# Patient Record
Sex: FEMALE | Race: WHITE | NOT HISPANIC OR LATINO | Employment: UNEMPLOYED | URBAN - METROPOLITAN AREA
[De-identification: names, ages, dates, MRNs, and addresses within clinical notes are randomized per-mention and may not be internally consistent; named-entity substitution may affect disease eponyms.]

---

## 2022-12-20 ENCOUNTER — OFFICE VISIT (OUTPATIENT)
Dept: FAMILY MEDICINE | Facility: CLINIC | Age: 57
End: 2022-12-20
Payer: COMMERCIAL

## 2022-12-20 VITALS
DIASTOLIC BLOOD PRESSURE: 72 MMHG | SYSTOLIC BLOOD PRESSURE: 110 MMHG | HEIGHT: 62 IN | HEART RATE: 101 BPM | TEMPERATURE: 98 F | BODY MASS INDEX: 26.56 KG/M2 | OXYGEN SATURATION: 97 % | WEIGHT: 144.31 LBS

## 2022-12-20 DIAGNOSIS — Z13.29 THYROID DISORDER SCREENING: ICD-10-CM

## 2022-12-20 DIAGNOSIS — Z12.11 COLON CANCER SCREENING: Primary | ICD-10-CM

## 2022-12-20 DIAGNOSIS — F32.A DEPRESSION, UNSPECIFIED DEPRESSION TYPE: ICD-10-CM

## 2022-12-20 DIAGNOSIS — Z13.1 DIABETES MELLITUS SCREENING: ICD-10-CM

## 2022-12-20 PROCEDURE — 1159F MED LIST DOCD IN RCRD: CPT | Mod: CPTII,S$GLB,, | Performed by: FAMILY MEDICINE

## 2022-12-20 PROCEDURE — 3008F PR BODY MASS INDEX (BMI) DOCUMENTED: ICD-10-PCS | Mod: CPTII,S$GLB,, | Performed by: FAMILY MEDICINE

## 2022-12-20 PROCEDURE — 3008F BODY MASS INDEX DOCD: CPT | Mod: CPTII,S$GLB,, | Performed by: FAMILY MEDICINE

## 2022-12-20 PROCEDURE — 99203 OFFICE O/P NEW LOW 30 MIN: CPT | Mod: S$GLB,,, | Performed by: FAMILY MEDICINE

## 2022-12-20 PROCEDURE — 1159F PR MEDICATION LIST DOCUMENTED IN MEDICAL RECORD: ICD-10-PCS | Mod: CPTII,S$GLB,, | Performed by: FAMILY MEDICINE

## 2022-12-20 PROCEDURE — 3074F SYST BP LT 130 MM HG: CPT | Mod: CPTII,S$GLB,, | Performed by: FAMILY MEDICINE

## 2022-12-20 PROCEDURE — 99203 PR OFFICE/OUTPT VISIT, NEW, LEVL III, 30-44 MIN: ICD-10-PCS | Mod: S$GLB,,, | Performed by: FAMILY MEDICINE

## 2022-12-20 PROCEDURE — 3078F DIAST BP <80 MM HG: CPT | Mod: CPTII,S$GLB,, | Performed by: FAMILY MEDICINE

## 2022-12-20 PROCEDURE — 3078F PR MOST RECENT DIASTOLIC BLOOD PRESSURE < 80 MM HG: ICD-10-PCS | Mod: CPTII,S$GLB,, | Performed by: FAMILY MEDICINE

## 2022-12-20 PROCEDURE — 3074F PR MOST RECENT SYSTOLIC BLOOD PRESSURE < 130 MM HG: ICD-10-PCS | Mod: CPTII,S$GLB,, | Performed by: FAMILY MEDICINE

## 2022-12-20 RX ORDER — ESCITALOPRAM OXALATE 20 MG/1
20 TABLET ORAL NIGHTLY
COMMUNITY
Start: 2022-12-15 | End: 2024-02-22 | Stop reason: SDUPTHER

## 2022-12-20 RX ORDER — BREXPIPRAZOLE 0.5 MG/1
0.5 TABLET ORAL DAILY
Qty: 30 TABLET | Refills: 0 | Status: SHIPPED | OUTPATIENT
Start: 2022-12-20 | End: 2023-01-30 | Stop reason: SDUPTHER

## 2022-12-20 RX ORDER — DIAZEPAM 5 MG/1
5 TABLET ORAL 3 TIMES DAILY
COMMUNITY
Start: 2022-12-15 | End: 2023-06-26 | Stop reason: SDUPTHER
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
## 2022-12-22 DIAGNOSIS — Z12.31 ENCOUNTER FOR SCREENING MAMMOGRAM FOR MALIGNANT NEOPLASM OF BREAST: Primary | ICD-10-CM

## 2023-01-09 ENCOUNTER — HOSPITAL ENCOUNTER (OUTPATIENT)
Dept: RADIOLOGY | Facility: HOSPITAL | Age: 58
Discharge: HOME | End: 2023-01-09
Attending: SPECIALIST
Payer: COMMERCIAL

## 2023-01-09 DIAGNOSIS — Z12.31 ENCOUNTER FOR SCREENING MAMMOGRAM FOR MALIGNANT NEOPLASM OF BREAST: ICD-10-CM

## 2023-01-09 PROCEDURE — 77067 SCR MAMMO BI INCL CAD: CPT | Mod: TC,PO

## 2023-01-24 LAB
ALBUMIN SERPL-MCNC: 4.1 G/DL (ref 3.6–5.1)
ALBUMIN/GLOB SERPL: 1.6 (CALC) (ref 1–2.5)
ALP SERPL-CCNC: 86 U/L (ref 37–153)
ALT SERPL-CCNC: 60 U/L (ref 6–29)
AST SERPL-CCNC: 35 U/L (ref 10–35)
BILIRUB SERPL-MCNC: 0.5 MG/DL (ref 0.2–1.2)
BUN SERPL-MCNC: 23 MG/DL (ref 7–25)
BUN/CREAT SERPL: ABNORMAL (CALC) (ref 6–22)
CALCIUM SERPL-MCNC: 9.4 MG/DL (ref 8.6–10.4)
CHLORIDE SERPL-SCNC: 105 MMOL/L (ref 98–110)
CO2 SERPL-SCNC: 30 MMOL/L (ref 20–32)
CREAT SERPL-MCNC: 0.72 MG/DL (ref 0.5–1.03)
EGFR: 96 ML/MIN/1.73M2
GLOBULIN SER CALC-MCNC: 2.6 G/DL (CALC) (ref 1.9–3.7)
GLUCOSE SERPL-MCNC: 83 MG/DL (ref 65–99)
POTASSIUM SERPL-SCNC: 4.5 MMOL/L (ref 3.5–5.3)
PROT SERPL-MCNC: 6.7 G/DL (ref 6.1–8.1)
SODIUM SERPL-SCNC: 140 MMOL/L (ref 135–146)
TSH SERPL-ACNC: 3.29 MIU/L (ref 0.4–4.5)

## 2023-01-30 ENCOUNTER — OFFICE VISIT (OUTPATIENT)
Dept: FAMILY MEDICINE | Facility: CLINIC | Age: 58
End: 2023-01-30
Payer: COMMERCIAL

## 2023-01-30 VITALS
HEART RATE: 86 BPM | SYSTOLIC BLOOD PRESSURE: 117 MMHG | DIASTOLIC BLOOD PRESSURE: 77 MMHG | BODY MASS INDEX: 28.31 KG/M2 | OXYGEN SATURATION: 97 % | HEIGHT: 62 IN | WEIGHT: 153.81 LBS

## 2023-01-30 DIAGNOSIS — Z13.220 ENCOUNTER FOR LIPID SCREENING FOR CARDIOVASCULAR DISEASE: ICD-10-CM

## 2023-01-30 DIAGNOSIS — F32.A DEPRESSION, UNSPECIFIED DEPRESSION TYPE: ICD-10-CM

## 2023-01-30 DIAGNOSIS — Z13.6 ENCOUNTER FOR LIPID SCREENING FOR CARDIOVASCULAR DISEASE: ICD-10-CM

## 2023-01-30 DIAGNOSIS — Z11.4 ENCOUNTER FOR SCREENING FOR HUMAN IMMUNODEFICIENCY VIRUS (HIV): Primary | ICD-10-CM

## 2023-01-30 DIAGNOSIS — Z12.11 COLON CANCER SCREENING: ICD-10-CM

## 2023-01-30 DIAGNOSIS — Z11.59 ENCOUNTER FOR HEPATITIS C SCREENING TEST FOR LOW RISK PATIENT: ICD-10-CM

## 2023-01-30 DIAGNOSIS — R74.01 TRANSAMINITIS: ICD-10-CM

## 2023-01-30 PROCEDURE — 3008F PR BODY MASS INDEX (BMI) DOCUMENTED: ICD-10-PCS | Mod: CPTII,S$GLB,, | Performed by: FAMILY MEDICINE

## 2023-01-30 PROCEDURE — 1159F MED LIST DOCD IN RCRD: CPT | Mod: CPTII,S$GLB,, | Performed by: FAMILY MEDICINE

## 2023-01-30 PROCEDURE — 3078F DIAST BP <80 MM HG: CPT | Mod: CPTII,S$GLB,, | Performed by: FAMILY MEDICINE

## 2023-01-30 PROCEDURE — 1159F PR MEDICATION LIST DOCUMENTED IN MEDICAL RECORD: ICD-10-PCS | Mod: CPTII,S$GLB,, | Performed by: FAMILY MEDICINE

## 2023-01-30 PROCEDURE — 3078F PR MOST RECENT DIASTOLIC BLOOD PRESSURE < 80 MM HG: ICD-10-PCS | Mod: CPTII,S$GLB,, | Performed by: FAMILY MEDICINE

## 2023-01-30 PROCEDURE — 3074F PR MOST RECENT SYSTOLIC BLOOD PRESSURE < 130 MM HG: ICD-10-PCS | Mod: CPTII,S$GLB,, | Performed by: FAMILY MEDICINE

## 2023-01-30 PROCEDURE — 3008F BODY MASS INDEX DOCD: CPT | Mod: CPTII,S$GLB,, | Performed by: FAMILY MEDICINE

## 2023-01-30 PROCEDURE — 99213 PR OFFICE/OUTPT VISIT, EST, LEVL III, 20-29 MIN: ICD-10-PCS | Mod: S$GLB,,, | Performed by: FAMILY MEDICINE

## 2023-01-30 PROCEDURE — 99213 OFFICE O/P EST LOW 20 MIN: CPT | Mod: S$GLB,,, | Performed by: FAMILY MEDICINE

## 2023-01-30 PROCEDURE — 3074F SYST BP LT 130 MM HG: CPT | Mod: CPTII,S$GLB,, | Performed by: FAMILY MEDICINE

## 2023-01-30 RX ORDER — BREXPIPRAZOLE 0.5 MG/1
0.5 TABLET ORAL DAILY
Qty: 90 TABLET | Refills: 1 | Status: SHIPPED | OUTPATIENT
Start: 2023-01-30 | End: 2023-06-26 | Stop reason: SDUPTHER

## 2023-01-30 NOTE — PROGRESS NOTES
SUBJECTIVE:    Patient ID: Tammy Yao is a 57 y.o. female.    Chief Complaint: Depression  56 yo female who was seen last month for depression was on Lexapro and diazepam in the last month was started on Rexulti 0.5 mg, patient who is accompanied by her daughter today states that her depression has much improved.    Patient recently had basic labs completed that showed an elevated ALT we have no previous labs to evaluate if this is an increase.  Her only medications currently are Lexapro diazepam and Rexulti will need to get follow-up labs.           Significant Past medical history   Depression: Lexapro 20 mg, Rexulti 0.5 mg  Insomnia: Diazepam 5 mg     Specialists  Gyn : Dr JERRY Paredes     Smoke:None  ETOH: None  Exercise: None     TSH 0.40 - 4.50 mIU/L 3.29      Glucose 65 - 99 mg/dL 83    Comment:          AST 10 - 35 U/L 35    ALT 6 - 29 U/L 60 High         HPI     Depression  Visit Type: follow-up  Patient presents with the following symptoms: anhedonia, depressed mood and excessive worry.  Patient is not experiencing: insomnia, memory impairment, nausea, nervousness/anxiety, palpitations, panic, psychomotor agitation, psychomotor retardation, restlessness, shortness of breath, suicidal ideas, suicidal planning, thoughts of death, weight gain and weight loss.   Severity: moderate   Sleep quality: fair  Compliance with medications:  %       History reviewed. No pertinent past medical history.  Social History     Socioeconomic History    Marital status:    Tobacco Use    Smoking status: Never    Smokeless tobacco: Never   Substance and Sexual Activity    Alcohol use: Yes     Comment: Occ    Drug use: Never    Sexual activity: Yes     Partners: Male     History reviewed. No pertinent surgical history.  Family History   Problem Relation Age of Onset    Diabetes Mother     Dementia Mother     Cancer Father     Diabetes Maternal Grandmother     Depression Maternal Grandmother      Current  "Outpatient Medications   Medication Sig Dispense Refill    brexpiprazole (REXULTI) 0.5 mg Tab Take 0.5 mg by mouth once daily. 90 tablet 1    diazePAM (VALIUM) 5 MG tablet Take 5 mg by mouth 3 (three) times daily.      EScitalopram oxalate (LEXAPRO) 20 MG tablet Take 20 mg by mouth every evening.       No current facility-administered medications for this visit.     Review of patient's allergies indicates:  No Known Allergies    Review of Systems   Constitutional:  Positive for activity change. Negative for diaphoresis, fatigue and unexpected weight change.   HENT:  Negative for congestion, hearing loss, rhinorrhea, sinus pressure, sinus pain and trouble swallowing.    Eyes:  Negative for discharge and visual disturbance.   Respiratory:  Negative for cough, chest tightness, shortness of breath and wheezing.    Cardiovascular:  Negative for chest pain and palpitations.   Gastrointestinal:  Negative for abdominal distention, abdominal pain, anal bleeding, blood in stool, constipation, diarrhea and vomiting.   Endocrine: Negative for polydipsia and polyuria.   Genitourinary:  Negative for difficulty urinating, dysuria, hematuria and menstrual problem.   Musculoskeletal:  Negative for arthralgias, joint swelling and neck pain.   Neurological:  Negative for weakness and headaches.   Psychiatric/Behavioral:  Negative for confusion and dysphoric mood.         Blood pressure 117/77, pulse 86, height 5' 2" (1.575 m), weight 69.8 kg (153 lb 12.8 oz), SpO2 97 %. Body mass index is 28.13 kg/m².   Objective:      Physical Exam  Vitals reviewed.   Constitutional:       General: She is not in acute distress.     Appearance: Normal appearance. She is not ill-appearing or toxic-appearing.   HENT:      Head: Normocephalic and atraumatic.      Right Ear: Tympanic membrane normal.      Left Ear: Tympanic membrane normal.      Nose: Nose normal. No congestion or rhinorrhea.   Cardiovascular:      Rate and Rhythm: Normal rate and regular " rhythm.      Pulses: Normal pulses.      Heart sounds: Normal heart sounds. No murmur heard.  Pulmonary:      Effort: Pulmonary effort is normal. No respiratory distress.      Breath sounds: Normal breath sounds. No wheezing.   Skin:     General: Skin is warm and dry.      Capillary Refill: Capillary refill takes less than 2 seconds.   Neurological:      Mental Status: She is alert and oriented to person, place, and time.           Assessment:       1. Encounter for screening for human immunodeficiency virus (HIV)    2. Colon cancer screening    3. Encounter for hepatitis C screening test for low risk patient    4. Transaminitis    5. Encounter for lipid screening for cardiovascular disease    6. Depression, unspecified depression type         Plan:           Encounter for screening for human immunodeficiency virus (HIV)  -     HIV 1/2 Ag/Ab (4th Gen); Future; Expected date: 01/30/2023    Colon cancer screening  -     Ambulatory referral/consult to Gastroenterology; Future; Expected date: 02/05/2023    Encounter for hepatitis C screening test for low risk patient  -     Hepatitis C Antibody; Future; Expected date: 01/30/2023    Transaminitis  -     Comprehensive Metabolic Panel; Future; Expected date: 01/30/2023    Encounter for lipid screening for cardiovascular disease  -     Lipid Panel; Future; Expected date: 01/30/2023    Depression, unspecified depression type  -     brexpiprazole (REXULTI) 0.5 mg Tab; Take 0.5 mg by mouth once daily.  Dispense: 90 tablet; Refill: 1

## 2023-02-02 ENCOUNTER — HOSPITAL ENCOUNTER (OUTPATIENT)
Dept: RADIOLOGY | Facility: HOSPITAL | Age: 58
Discharge: HOME | End: 2023-02-02
Attending: SPECIALIST
Payer: COMMERCIAL

## 2023-02-02 DIAGNOSIS — R92.2 INCONCLUSIVE MAMMOGRAM: ICD-10-CM

## 2023-02-02 PROCEDURE — 77066 DX MAMMO INCL CAD BI: CPT | Mod: TC,PO

## 2023-04-26 ENCOUNTER — PATIENT MESSAGE (OUTPATIENT)
Dept: FAMILY MEDICINE | Facility: CLINIC | Age: 58
End: 2023-04-26

## 2023-04-28 ENCOUNTER — LAB VISIT (OUTPATIENT)
Dept: LAB | Facility: HOSPITAL | Age: 58
End: 2023-04-28
Attending: FAMILY MEDICINE
Payer: COMMERCIAL

## 2023-04-28 DIAGNOSIS — Z13.220 ENCOUNTER FOR LIPID SCREENING FOR CARDIOVASCULAR DISEASE: ICD-10-CM

## 2023-04-28 DIAGNOSIS — Z11.59 ENCOUNTER FOR HEPATITIS C SCREENING TEST FOR LOW RISK PATIENT: ICD-10-CM

## 2023-04-28 DIAGNOSIS — R74.01 TRANSAMINITIS: ICD-10-CM

## 2023-04-28 DIAGNOSIS — Z11.4 ENCOUNTER FOR SCREENING FOR HUMAN IMMUNODEFICIENCY VIRUS (HIV): ICD-10-CM

## 2023-04-28 DIAGNOSIS — Z13.6 ENCOUNTER FOR LIPID SCREENING FOR CARDIOVASCULAR DISEASE: ICD-10-CM

## 2023-04-28 LAB
ALBUMIN SERPL BCP-MCNC: 3.9 G/DL (ref 3.5–5.2)
ALP SERPL-CCNC: 75 U/L (ref 55–135)
ALT SERPL W/O P-5'-P-CCNC: 39 U/L (ref 10–44)
ANION GAP SERPL CALC-SCNC: 6 MMOL/L (ref 8–16)
AST SERPL-CCNC: 30 U/L (ref 10–40)
BILIRUB SERPL-MCNC: 0.6 MG/DL (ref 0.1–1)
BUN SERPL-MCNC: 22 MG/DL (ref 6–20)
CALCIUM SERPL-MCNC: 9.4 MG/DL (ref 8.7–10.5)
CHLORIDE SERPL-SCNC: 106 MMOL/L (ref 95–110)
CHOLEST SERPL-MCNC: 293 MG/DL (ref 120–199)
CHOLEST/HDLC SERPL: 3.2 {RATIO} (ref 2–5)
CO2 SERPL-SCNC: 27 MMOL/L (ref 23–29)
CREAT SERPL-MCNC: 0.7 MG/DL (ref 0.5–1.4)
EST. GFR  (NO RACE VARIABLE): >60 ML/MIN/1.73 M^2
GLUCOSE SERPL-MCNC: 85 MG/DL (ref 70–110)
HDLC SERPL-MCNC: 91 MG/DL (ref 40–75)
HDLC SERPL: 31.1 % (ref 20–50)
LDLC SERPL CALC-MCNC: 189 MG/DL (ref 63–159)
NONHDLC SERPL-MCNC: 202 MG/DL
POTASSIUM SERPL-SCNC: 3.8 MMOL/L (ref 3.5–5.1)
PROT SERPL-MCNC: 7.4 G/DL (ref 6–8.4)
SODIUM SERPL-SCNC: 139 MMOL/L (ref 136–145)
TRIGL SERPL-MCNC: 65 MG/DL (ref 30–150)

## 2023-04-28 PROCEDURE — 36415 COLL VENOUS BLD VENIPUNCTURE: CPT | Performed by: FAMILY MEDICINE

## 2023-04-28 PROCEDURE — 80061 LIPID PANEL: CPT | Performed by: FAMILY MEDICINE

## 2023-04-28 PROCEDURE — 86803 HEPATITIS C AB TEST: CPT | Performed by: FAMILY MEDICINE

## 2023-04-28 PROCEDURE — 80053 COMPREHEN METABOLIC PANEL: CPT | Performed by: FAMILY MEDICINE

## 2023-04-28 PROCEDURE — 87389 HIV-1 AG W/HIV-1&-2 AB AG IA: CPT | Performed by: FAMILY MEDICINE

## 2023-04-29 LAB
HCV AB S/CO SERPL IA: NON REACTIVE
HIV 1+2 AB+HIV1 P24 AG SERPL QL IA: NON REACTIVE

## 2023-05-01 ENCOUNTER — OFFICE VISIT (OUTPATIENT)
Dept: FAMILY MEDICINE | Facility: CLINIC | Age: 58
End: 2023-05-01
Payer: COMMERCIAL

## 2023-05-01 VITALS
WEIGHT: 173.63 LBS | OXYGEN SATURATION: 97 % | HEIGHT: 62 IN | SYSTOLIC BLOOD PRESSURE: 103 MMHG | BODY MASS INDEX: 31.95 KG/M2 | DIASTOLIC BLOOD PRESSURE: 67 MMHG | HEART RATE: 84 BPM

## 2023-05-01 DIAGNOSIS — R74.01 TRANSAMINITIS: ICD-10-CM

## 2023-05-01 DIAGNOSIS — F32.A DEPRESSION, UNSPECIFIED DEPRESSION TYPE: Primary | ICD-10-CM

## 2023-05-01 PROCEDURE — 3008F PR BODY MASS INDEX (BMI) DOCUMENTED: ICD-10-PCS | Mod: CPTII,S$GLB,, | Performed by: FAMILY MEDICINE

## 2023-05-01 PROCEDURE — 99213 OFFICE O/P EST LOW 20 MIN: CPT | Mod: S$GLB,,, | Performed by: FAMILY MEDICINE

## 2023-05-01 PROCEDURE — 3078F PR MOST RECENT DIASTOLIC BLOOD PRESSURE < 80 MM HG: ICD-10-PCS | Mod: CPTII,S$GLB,, | Performed by: FAMILY MEDICINE

## 2023-05-01 PROCEDURE — 1159F MED LIST DOCD IN RCRD: CPT | Mod: CPTII,S$GLB,, | Performed by: FAMILY MEDICINE

## 2023-05-01 PROCEDURE — 3074F PR MOST RECENT SYSTOLIC BLOOD PRESSURE < 130 MM HG: ICD-10-PCS | Mod: CPTII,S$GLB,, | Performed by: FAMILY MEDICINE

## 2023-05-01 PROCEDURE — 1159F PR MEDICATION LIST DOCUMENTED IN MEDICAL RECORD: ICD-10-PCS | Mod: CPTII,S$GLB,, | Performed by: FAMILY MEDICINE

## 2023-05-01 PROCEDURE — 3074F SYST BP LT 130 MM HG: CPT | Mod: CPTII,S$GLB,, | Performed by: FAMILY MEDICINE

## 2023-05-01 PROCEDURE — 3078F DIAST BP <80 MM HG: CPT | Mod: CPTII,S$GLB,, | Performed by: FAMILY MEDICINE

## 2023-05-01 PROCEDURE — 3008F BODY MASS INDEX DOCD: CPT | Mod: CPTII,S$GLB,, | Performed by: FAMILY MEDICINE

## 2023-05-01 PROCEDURE — 99213 PR OFFICE/OUTPT VISIT, EST, LEVL III, 20-29 MIN: ICD-10-PCS | Mod: S$GLB,,, | Performed by: FAMILY MEDICINE

## 2023-05-01 NOTE — PROGRESS NOTES
SUBJECTIVE:    Patient ID: Tammy Yao is a 58 y.o. female.    Chief Complaint: Lab Review  59 yo female who was started on lexapro and rexulti for resistant depression, pt is doing well with her depression, at last visit pt had elevated LFT, we repeated and her enzymes have returned to normal.       Patient had basic labs completed after her initial visit and she had  elevated ALT , her repeat labs have normalized.      AST 10 - 40 U/L 30  35 R    ALT 10 - 44 U/L 39  60 High  R         Significant Past medical history   Depression: Lexapro 20 mg, Rexulti 0.5 mg  Insomnia: Diazepam 5 mg     Specialists  Gyn : Dr JERRY Paredes  GI: Dr Heredia     Smoke:None  ETOH: None  Exercise: None    Lipid  Chol: 293  Tri  HDL: 91  LDL: 189      HPI      History reviewed. No pertinent past medical history.  Social History     Socioeconomic History    Marital status:    Tobacco Use    Smoking status: Never    Smokeless tobacco: Never   Substance and Sexual Activity    Alcohol use: Yes     Comment: Occ    Drug use: Never    Sexual activity: Yes     Partners: Male     History reviewed. No pertinent surgical history.  Family History   Problem Relation Age of Onset    Diabetes Mother     Dementia Mother     Cancer Father     Diabetes Maternal Grandmother     Depression Maternal Grandmother      Current Outpatient Medications   Medication Sig Dispense Refill    brexpiprazole (REXULTI) 0.5 mg Tab Take 0.5 mg by mouth once daily. 90 tablet 1    diazePAM (VALIUM) 5 MG tablet Take 5 mg by mouth 3 (three) times daily.      EScitalopram oxalate (LEXAPRO) 20 MG tablet Take 20 mg by mouth every evening.       No current facility-administered medications for this visit.     Review of patient's allergies indicates:  No Known Allergies    Review of Systems   Constitutional:  Positive for activity change and unexpected weight change (pt has gained weight since starting on the rexaulti.). Negative for diaphoresis and fatigue.   HENT:   "Negative for congestion, hearing loss, rhinorrhea and trouble swallowing.    Eyes:  Negative for discharge and visual disturbance.   Respiratory:  Negative for cough, chest tightness, shortness of breath and wheezing.    Cardiovascular:  Negative for chest pain and palpitations.   Gastrointestinal:  Negative for blood in stool, constipation, diarrhea and vomiting.   Endocrine: Negative for polydipsia and polyuria.   Genitourinary:  Negative for difficulty urinating, dysuria, hematuria and menstrual problem.   Musculoskeletal:  Negative for arthralgias, joint swelling and neck pain.   Neurological:  Negative for weakness and headaches.   Psychiatric/Behavioral:  Negative for confusion and dysphoric mood.         Blood pressure 103/67, pulse 84, height 5' 2" (1.575 m), weight 78.7 kg (173 lb 9.6 oz), SpO2 97 %. Body mass index is 31.75 kg/m².   Objective:      Physical Exam  Vitals reviewed.   Constitutional:       General: She is not in acute distress.     Appearance: Normal appearance. She is not ill-appearing or toxic-appearing.   HENT:      Head: Normocephalic and atraumatic.      Right Ear: Tympanic membrane normal.      Left Ear: Tympanic membrane normal.      Nose: Nose normal. No congestion or rhinorrhea.   Cardiovascular:      Rate and Rhythm: Normal rate and regular rhythm.      Pulses: Normal pulses.      Heart sounds: Normal heart sounds. No murmur heard.  Pulmonary:      Effort: Pulmonary effort is normal. No respiratory distress.      Breath sounds: Normal breath sounds. No wheezing.   Skin:     General: Skin is warm and dry.      Capillary Refill: Capillary refill takes less than 2 seconds.   Neurological:      Mental Status: She is alert and oriented to person, place, and time.           Assessment:       1. Depression, unspecified depression type    2. Transaminitis         Plan:           Depression, unspecified depression type  Controlled will continue on current dose of " medications  Transaminitis  Elevated ALT is resolved will not continue with further workup

## 2023-06-05 LAB — CRC RECOMMENDATION EXT: NORMAL

## 2023-06-06 ENCOUNTER — PATIENT OUTREACH (OUTPATIENT)
Dept: ADMINISTRATIVE | Facility: HOSPITAL | Age: 58
End: 2023-06-06

## 2023-06-06 NOTE — PROGRESS NOTES
Population Health Chart Review & Patient Outreach Details:     Reason for Outreach Encounter:     [x]  Non-Compliant Report   []  Payor Report ([employer], PHN, BCBS, MSSP, MCIP, UHC, etc.)   []  Pre-Visit Chart Review     Updates Requested / Reviewed:     []  Care Everywhere    []     []  External Sources ([facility], [facility], DIS, etc.)   [x]  Care Team Updated    Patient Outreach Method:    []  Telephone Outreach Completed   [] Successful   [] Left Voicemail   [] Unable to Contact (wrong number, no voicemail)  []  [employer] Portal Outreach Sent  []  Letter Outreach Mailed  []  Fax Sent for External Records  [x]  External Records Upload    Health Maintenance Topics Addressed and Outreach Outcomes / Actions Taken:        []      Breast Cancer Screening []  Mammo Scheduled      []  External Records Requested     []  Added Reminder to Complete to Upcoming Primary Care Appt Notes     []  Patient Declined     []  Patient Will Call Back to Schedule     []  Patient Will Schedule with External Provider / Order Routed if Applicable             []       Cervical Cancer Screening []  Pap Scheduled      []  External Records Requested     []  Added Reminder to Complete to Upcoming Primary Care Appt Notes     []  Patient Declined     []  Patient Will Call Back to Schedule     []  Patient Will Schedule with External Provider               [x]          Colorectal Cancer Screening []  Colonoscopy Case Request or Referral Placed     []  External Records Requested     []  Added Reminder to Complete to Upcoming Primary Care Appt Notes     []  Patient Declined     []  Patient Will Call Back to Schedule     []  Patient Will Schedule with External Provider     []  Fit Kit Mailed (add the SmartPhrase under additional notes)     []  Reminded Patient to Complete Home Test             []      Diabetic Eye Exam []  Eye Camera Scheduled or Optometry Referral Placed     []  External Records Requested     []  Added Reminder to Complete to  Upcoming Primary Care Appt Notes     []  Patient Declined     []  Patient Will Call Back to Schedule     []  Patient Will Schedule with External Provider             []      Blood Pressure Control []  Primary Care Follow Up Visit Scheduled     []  Remote Blood Pressure Reading Captured     []  Added Reminder to Complete to Upcoming Primary Care Appt Notes     []  Patient Declined     []  Patient Will Call Back / Patient Will Send Portal Message with Reading     []  Patient Will Call Back to Schedule Provider Visit             []       HbA1c & Other Labs []  Lab Appt Scheduled for Due Labs     []  Primary Care Follow Up Visit Scheduled      []  Reminded Patient to Complete Home Test     []  Added Reminder to Complete to Upcoming Primary Care Appt Notes     []  Patient Declined     []  Patient Will Call Back to Schedule     []  Patient Will Schedule with External Provider / Order Routed if Applicable           []    Schedule Primary Care Appt []  Primary Care Appt Scheduled     []  Patient Declined     []  Patient Will Call Back to Schedule     []  Pt Established with External Provider & Updated Care Team             []      Medication Adherence []  Primary Care Appointment Scheduled     []  Added Reminder to Upcoming Primary Care Appt Notes     []  Patient Reminded to  Prescription     []  Patient Declined, Provider Notified if Needed     []  Sent Provider Message to Review and/or Add Exclusion to Problem List             []      Osteoporosis Screening []  DXA Appointment Scheduled     []  External Records Requested     []  Added Reminder to Complete to Upcoming Primary Care Appt Notes     []  Patient Declined     []  Patient Will Call Back to Schedule     []  Patient Will Schedule with External Provider / Order Routed if Applicable     Additional Care Coordinator Notes:    External record ( Colonoscopy Report 6-5-23 ) hyperlinked in Health Maintenance.  Immunizations reviewed.     Further Action Needed If  Patient Returns Outreach:

## 2023-06-26 DIAGNOSIS — F32.A DEPRESSION, UNSPECIFIED DEPRESSION TYPE: ICD-10-CM

## 2023-06-26 RX ORDER — DIAZEPAM 5 MG/1
5 TABLET ORAL DAILY PRN
Qty: 30 TABLET | Refills: 0 | Status: SHIPPED | OUTPATIENT
Start: 2023-06-26

## 2023-06-26 RX ORDER — BREXPIPRAZOLE 0.5 MG/1
0.5 TABLET ORAL DAILY
Qty: 90 TABLET | Refills: 1 | Status: SHIPPED | OUTPATIENT
Start: 2023-06-26 | End: 2024-01-23

## 2024-01-23 DIAGNOSIS — F32.A DEPRESSION, UNSPECIFIED DEPRESSION TYPE: ICD-10-CM

## 2024-01-23 RX ORDER — BREXPIPRAZOLE 0.5 MG/1
1 TABLET ORAL
Qty: 90 TABLET | Refills: 0 | Status: SHIPPED | OUTPATIENT
Start: 2024-01-23 | End: 2024-04-15

## 2024-02-22 DIAGNOSIS — F32.A DEPRESSION, UNSPECIFIED DEPRESSION TYPE: Primary | ICD-10-CM

## 2024-02-23 RX ORDER — ESCITALOPRAM OXALATE 20 MG/1
20 TABLET ORAL NIGHTLY
Qty: 90 TABLET | Refills: 1 | Status: SHIPPED | OUTPATIENT
Start: 2024-02-23

## 2024-04-01 ENCOUNTER — OFFICE VISIT (OUTPATIENT)
Dept: FAMILY MEDICINE | Facility: CLINIC | Age: 59
End: 2024-04-01
Payer: COMMERCIAL

## 2024-04-01 VITALS
WEIGHT: 204 LBS | HEIGHT: 62 IN | DIASTOLIC BLOOD PRESSURE: 86 MMHG | HEART RATE: 68 BPM | SYSTOLIC BLOOD PRESSURE: 132 MMHG | OXYGEN SATURATION: 98 % | BODY MASS INDEX: 37.54 KG/M2

## 2024-04-01 DIAGNOSIS — M54.31 RIGHT SIDED SCIATICA: ICD-10-CM

## 2024-04-01 DIAGNOSIS — R22.41 LOCALIZED SWELLING OF RIGHT LOWER EXTREMITY: Primary | ICD-10-CM

## 2024-04-01 PROCEDURE — 3008F BODY MASS INDEX DOCD: CPT | Mod: CPTII,S$GLB,, | Performed by: NURSE PRACTITIONER

## 2024-04-01 PROCEDURE — 1160F RVW MEDS BY RX/DR IN RCRD: CPT | Mod: CPTII,S$GLB,, | Performed by: NURSE PRACTITIONER

## 2024-04-01 PROCEDURE — 1159F MED LIST DOCD IN RCRD: CPT | Mod: CPTII,S$GLB,, | Performed by: NURSE PRACTITIONER

## 2024-04-01 PROCEDURE — 99214 OFFICE O/P EST MOD 30 MIN: CPT | Mod: S$GLB,,, | Performed by: NURSE PRACTITIONER

## 2024-04-01 PROCEDURE — 3075F SYST BP GE 130 - 139MM HG: CPT | Mod: CPTII,S$GLB,, | Performed by: NURSE PRACTITIONER

## 2024-04-01 PROCEDURE — 3079F DIAST BP 80-89 MM HG: CPT | Mod: CPTII,S$GLB,, | Performed by: NURSE PRACTITIONER

## 2024-04-01 PROCEDURE — 99999 PR PBB SHADOW E&M-EST. PATIENT-LVL IV: CPT | Mod: PBBFAC,,, | Performed by: NURSE PRACTITIONER

## 2024-04-01 RX ORDER — METHYLPREDNISOLONE 4 MG/1
TABLET ORAL
Qty: 21 EACH | Refills: 0 | Status: SHIPPED | OUTPATIENT
Start: 2024-04-01 | End: 2024-04-22

## 2024-04-01 RX ORDER — NAPROXEN 500 MG/1
500 TABLET ORAL 2 TIMES DAILY
Qty: 28 TABLET | Refills: 0 | Status: SHIPPED | OUTPATIENT
Start: 2024-04-01 | End: 2024-05-13

## 2024-04-01 NOTE — PROGRESS NOTES
SUBJECTIVE:      Patient ID: Tammy Yao is a 59 y.o. female.    Chief Complaint: Back Pain and Leg Swelling    59-year-old female presents to the clinic with complaints of back pain and leg swelling. Symptom started 1 week ago. Back pain occurred from a twisting injury. Pain is to the right lower back radiating to right lower extremity. Right calf and lower leg swelling started yesterday, first notice after waking up. Treating symptoms with ice and ibuprofen. Denies clotting disorders, DVT/PE. Denies calf pain, numbness/tingling, bladder/bowel incontinence.         Family History   Problem Relation Age of Onset    Diabetes Mother     Dementia Mother     Cancer Father     Diabetes Maternal Grandmother     Depression Maternal Grandmother       Social History     Socioeconomic History    Marital status:    Tobacco Use    Smoking status: Never    Smokeless tobacco: Never   Substance and Sexual Activity    Alcohol use: Yes     Comment: Occ    Drug use: Never    Sexual activity: Yes     Partners: Male     Current Outpatient Medications   Medication Sig Dispense Refill    diazePAM (VALIUM) 5 MG tablet Take 1 tablet (5 mg total) by mouth daily as needed for Anxiety (panic attack). 30 tablet 0    EScitalopram oxalate (LEXAPRO) 20 MG tablet Take 1 tablet (20 mg total) by mouth every evening. 90 tablet 1    methylPREDNISolone (MEDROL DOSEPACK) 4 mg tablet use as directed 21 each 0    naproxen (NAPROSYN) 500 MG tablet Take 1 tablet (500 mg total) by mouth 2 (two) times daily. 28 tablet 0    REXULTI 0.5 mg Tab Take 1 tablet by mouth once daily 90 tablet 0     No current facility-administered medications for this visit.     Review of patient's allergies indicates:  No Known Allergies   Past Medical History:   Diagnosis Date    Personal history of colonic polyps 06/05/2023     History reviewed. No pertinent surgical history.    Review of Systems   Constitutional:  Negative for activity change, appetite change, chills,  "diaphoresis, fatigue, fever and unexpected weight change.   HENT:  Negative for congestion, ear pain, sinus pressure, sore throat, trouble swallowing and voice change.    Eyes:  Negative for pain, discharge and visual disturbance.   Respiratory:  Negative for cough, chest tightness, shortness of breath and wheezing.    Cardiovascular:  Positive for leg swelling (right). Negative for chest pain and palpitations.   Gastrointestinal:  Negative for abdominal pain, constipation, diarrhea, nausea and vomiting.   Genitourinary:  Negative for difficulty urinating, flank pain, frequency and urgency.   Musculoskeletal:  Positive for back pain. Negative for joint swelling and neck pain.   Skin:  Negative for color change and rash.   Neurological:  Negative for dizziness, seizures, syncope, weakness, numbness and headaches.   Hematological:  Negative for adenopathy.   Psychiatric/Behavioral:  Negative for dysphoric mood and sleep disturbance. The patient is not nervous/anxious.       OBJECTIVE:      Vitals:    04/01/24 1412 04/01/24 1441   BP: (!) 142/86 132/86   BP Location: Left arm    Patient Position: Sitting    BP Method: Medium (Manual)    Pulse: 68    SpO2: 98%    Weight: 92.5 kg (204 lb)    Height: 5' 2" (1.575 m)      Physical Exam  Vitals and nursing note reviewed.   Constitutional:       General: She is awake. She is not in acute distress.     Appearance: Normal appearance. She is not ill-appearing, toxic-appearing or diaphoretic.   HENT:      Head: Normocephalic and atraumatic.      Nose: Nose normal.   Eyes:      General: Lids are normal. Gaze aligned appropriately.      Conjunctiva/sclera: Conjunctivae normal.      Right eye: Right conjunctiva is not injected.      Left eye: Left conjunctiva is not injected.      Pupils: Pupils are equal, round, and reactive to light.   Cardiovascular:      Rate and Rhythm: Normal rate and regular rhythm.      Pulses: Normal pulses.           Dorsalis pedis pulses are 2+ on the " right side.        Posterior tibial pulses are 2+ on the right side.      Heart sounds: Normal heart sounds, S1 normal and S2 normal. No murmur heard.     No friction rub. No gallop.   Pulmonary:      Effort: Pulmonary effort is normal. No respiratory distress.      Breath sounds: Normal breath sounds. No stridor. No decreased breath sounds, wheezing, rhonchi or rales.   Chest:      Chest wall: No tenderness.   Musculoskeletal:      Cervical back: Neck supple.      Lumbar back: Tenderness present. Decreased range of motion. Positive right straight leg raise test.        Back:       Right lower leg: Swelling present. No edema.      Left lower leg: No edema.      Comments: There is no midline spinal tenderness or palpable deformity. Right calf and right ankle swelling. No erythema noted. Jonelle's sign negative.    Lymphadenopathy:      Cervical: No cervical adenopathy.   Skin:     General: Skin is warm and dry.      Capillary Refill: Capillary refill takes less than 2 seconds.      Findings: No erythema or rash.   Neurological:      Mental Status: She is alert and oriented to person, place, and time. Mental status is at baseline.   Psychiatric:         Attention and Perception: Attention normal.         Mood and Affect: Mood normal.         Speech: Speech normal.         Behavior: Behavior normal. Behavior is cooperative.         Thought Content: Thought content normal.         Judgment: Judgment normal.        Assessment:       1. Localized swelling of right lower extremity    2. Right sided sciatica        Plan:       Localized swelling of right lower extremity  Will check venous US to r/o DVT.   -     US Lower Extremity Veins Right; Future; Expected date: 04/01/2024    Right sided sciatica  Start medrol dose pack and Naproxen. Rest. Apply heat or ice. Avoid heavy lifting and twisting. Back exercises discussed and provided at discharge. Discussed PT if no improvement.   -     methylPREDNISolone (MEDROL DOSEPACK) 4 mg  tablet; use as directed  Dispense: 21 each; Refill: 0  -     naproxen (NAPROSYN) 500 MG tablet; Take 1 tablet (500 mg total) by mouth 2 (two) times daily.  Dispense: 28 tablet; Refill: 0    This note was created using [employer] voice recognition software that occasionally misinterprets phrases or words.     I spent a total of 25 minutes on the day of the visit.This includes face to face time and non-face to face time preparing to see the patient (eg, review of tests), obtaining and/or reviewing separately obtained history, documenting clinical information in the electronic or other health record, independently interpreting results and communicating results to the patient/family/caregiver, or care coordinator.    Follow up if symptoms worsen or fail to improve.          4/1/2024 SHARON Antony, FNP

## 2024-04-04 ENCOUNTER — HOSPITAL ENCOUNTER (OUTPATIENT)
Dept: RADIOLOGY | Facility: HOSPITAL | Age: 59
Discharge: HOME | End: 2024-04-04
Attending: NURSE PRACTITIONER
Payer: COMMERCIAL

## 2024-04-04 DIAGNOSIS — R22.41 LOCALIZED SWELLING OF RIGHT LOWER EXTREMITY: ICD-10-CM

## 2024-04-04 PROCEDURE — 93971 EXTREMITY STUDY: CPT | Mod: 26,RT,, | Performed by: RADIOLOGY

## 2024-04-04 PROCEDURE — 93971 EXTREMITY STUDY: CPT | Mod: TC,RT

## 2024-04-14 DIAGNOSIS — F32.A DEPRESSION, UNSPECIFIED DEPRESSION TYPE: ICD-10-CM

## 2024-04-15 RX ORDER — BREXPIPRAZOLE 0.5 MG/1
1 TABLET ORAL
Qty: 90 TABLET | Refills: 0 | Status: SHIPPED | OUTPATIENT
Start: 2024-04-15 | End: 2024-05-13 | Stop reason: ALTCHOICE

## 2024-04-15 NOTE — TELEPHONE ENCOUNTER
Last visit was 04/01/2024 with YOLANDA Montenegro. Last visit with you was 05/01/2023. Next appointment is 05/13/2024

## 2024-05-13 ENCOUNTER — OFFICE VISIT (OUTPATIENT)
Dept: FAMILY MEDICINE | Facility: CLINIC | Age: 59
End: 2024-05-13
Payer: COMMERCIAL

## 2024-05-13 ENCOUNTER — TELEPHONE (OUTPATIENT)
Dept: PSYCHIATRY | Facility: CLINIC | Age: 59
End: 2024-05-13
Payer: COMMERCIAL

## 2024-05-13 VITALS
WEIGHT: 197.88 LBS | SYSTOLIC BLOOD PRESSURE: 110 MMHG | DIASTOLIC BLOOD PRESSURE: 64 MMHG | HEIGHT: 62 IN | HEART RATE: 95 BPM | BODY MASS INDEX: 36.42 KG/M2 | OXYGEN SATURATION: 97 %

## 2024-05-13 DIAGNOSIS — L72.11 PILAR CYST: ICD-10-CM

## 2024-05-13 DIAGNOSIS — Z13.31 POSITIVE DEPRESSION SCREENING: ICD-10-CM

## 2024-05-13 DIAGNOSIS — F41.9 ANXIETY: Primary | ICD-10-CM

## 2024-05-13 DIAGNOSIS — F32.0 MAJOR DEPRESSIVE DISORDER, SINGLE EPISODE, MILD: ICD-10-CM

## 2024-05-13 DIAGNOSIS — F32.A DEPRESSION, UNSPECIFIED DEPRESSION TYPE: ICD-10-CM

## 2024-05-13 PROCEDURE — 1159F MED LIST DOCD IN RCRD: CPT | Mod: CPTII,S$GLB,, | Performed by: FAMILY MEDICINE

## 2024-05-13 PROCEDURE — 3008F BODY MASS INDEX DOCD: CPT | Mod: CPTII,S$GLB,, | Performed by: FAMILY MEDICINE

## 2024-05-13 PROCEDURE — 3078F DIAST BP <80 MM HG: CPT | Mod: CPTII,S$GLB,, | Performed by: FAMILY MEDICINE

## 2024-05-13 PROCEDURE — 3074F SYST BP LT 130 MM HG: CPT | Mod: CPTII,S$GLB,, | Performed by: FAMILY MEDICINE

## 2024-05-13 PROCEDURE — 99999 PR PBB SHADOW E&M-EST. PATIENT-LVL IV: CPT | Mod: PBBFAC,,, | Performed by: FAMILY MEDICINE

## 2024-05-13 PROCEDURE — 99214 OFFICE O/P EST MOD 30 MIN: CPT | Mod: S$GLB,,, | Performed by: FAMILY MEDICINE

## 2024-05-13 RX ORDER — BREXPIPRAZOLE 1 MG/1
1 TABLET ORAL DAILY
Qty: 90 TABLET | Refills: 1 | Status: SHIPPED | OUTPATIENT
Start: 2024-05-13

## 2024-05-13 RX ORDER — DIAZEPAM 2 MG/1
2 TABLET ORAL EVERY 8 HOURS PRN
Qty: 20 TABLET | Refills: 0 | Status: SHIPPED | OUTPATIENT
Start: 2024-05-13 | End: 2024-06-17

## 2024-05-13 NOTE — TELEPHONE ENCOUNTER
Spoke to patient regarding the below message. Advised of the wait list. Patient will speak with daughter and call our office back to let us know if she needs to be placed on the wait list.

## 2024-05-13 NOTE — PROGRESS NOTES
SUBJECTIVE:    Patient ID: Tammy Yao is a 59 y.o. female.    Chief Complaint: Anxiety and Depression  59 yo female who was supposed to be here today for annual exam, but they have a complaint of increasing depression and anxiety. Several years ago when she first established she was started on Lexapro and Rexulti, her medications have remained stable. Her daughter accompanies her today stating that she has recently become an empty ching, and her  works out of town all week long, She is home alone and rarely leaves home, she has very few friends, no hobbies, and mainly maintains her home during the day, no regular exercise.           Significant Past medical history  Depression: Lexapro 20 mg, Rexulti 0.5 mg (will increase)   Insomnia: Diazepam 5 mg     Specialists  Gyn : Dr JERRY Paredes  GI: Dr Heredia     Smoke:None  ETOH: None  Exercise: None      Depression  Visit Type: follow-up  Patient presents with the following symptoms: anhedonia, decreased concentration, depressed mood, excessive worry, memory impairment, nervousness/anxiety and weight gain.  Patient is not experiencing: irritability, palpitations, shortness of breath, suicidal ideas, suicidal planning, thoughts of death and weight loss.  Frequency of symptoms: most days   Severity: moderate   Sleep quality: fair  Compliance with medications:  %    Anxiety  Presents for follow-up visit. Symptoms include decreased concentration, depressed mood, excessive worry and nervous/anxious behavior. Patient reports no chest pain, irritability, palpitations, shortness of breath or suicidal ideas. Symptoms occur most days. The severity of symptoms is moderate. The quality of sleep is fair.             Past Medical History:   Diagnosis Date    Personal history of colonic polyps 06/05/2023     Social History     Socioeconomic History    Marital status:    Tobacco Use    Smoking status: Never    Smokeless tobacco: Never   Substance and Sexual  Activity    Alcohol use: Yes     Comment: Occ    Drug use: Never    Sexual activity: Yes     Partners: Male     Social Determinants of Health     Financial Resource Strain: Low Risk  (5/12/2024)    Overall Financial Resource Strain (CARDIA)     Difficulty of Paying Living Expenses: Not very hard   Food Insecurity: Unknown (5/12/2024)    Hunger Vital Sign     Worried About Running Out of Food in the Last Year: Never true   Transportation Needs: No Transportation Needs (5/12/2024)    PRAPARE - Transportation     Lack of Transportation (Medical): No     Lack of Transportation (Non-Medical): No   Physical Activity: Inactive (5/12/2024)    Exercise Vital Sign     Days of Exercise per Week: 0 days     Minutes of Exercise per Session: 0 min   Stress: Stress Concern Present (5/12/2024)    [employer] [place] [employer] - Occupational Stress Questionnaire     Feeling of Stress : To some extent   Housing Stability: Unknown (5/12/2024)    Housing Stability Vital Sign     Unable to Pay for Housing in the Last Year: No     History reviewed. No pertinent surgical history.  Family History   Problem Relation Name Age of Onset    Diabetes Mother      Dementia Mother      Cancer Father      Diabetes Maternal Grandmother      Depression Maternal Grandmother       Current Outpatient Medications   Medication Sig Dispense Refill    EScitalopram oxalate (LEXAPRO) 20 MG tablet Take 1 tablet (20 mg total) by mouth every evening. 90 tablet 1    brexpiprazole (REXULTI) 1 mg Tab Take 1 tablet (1 mg total) by mouth once daily. 90 tablet 1    diazePAM (VALIUM) 2 MG tablet Take 1 tablet (2 mg total) by mouth every 8 (eight) hours as needed for Anxiety. 20 tablet 0     No current facility-administered medications for this visit.     Review of patient's allergies indicates:  No Known Allergies    Review of Systems   Constitutional:  Positive for weight gain. Negative for activity change, appetite change, fatigue, fever, irritability,  "unexpected weight change and weight loss.   HENT:  Negative for congestion, postnasal drip, rhinorrhea and sinus pain.    Respiratory:  Negative for cough, chest tightness, shortness of breath and wheezing.    Cardiovascular:  Negative for chest pain and palpitations.   Gastrointestinal:  Negative for abdominal distention, abdominal pain, anal bleeding, blood in stool, constipation and diarrhea.   Genitourinary:  Negative for dysuria, flank pain, frequency, hematuria and urgency.   Skin:         Bump on her scalp     Psychiatric/Behavioral:  Positive for decreased concentration and depression. Negative for suicidal ideas. The patient is nervous/anxious.           Blood pressure 110/64, pulse 95, height 5' 2" (1.575 m), weight 89.8 kg (197 lb 14.4 oz), SpO2 97%. Body mass index is 36.2 kg/m².   Objective:      Physical Exam  Vitals reviewed.   Constitutional:       General: She is not in acute distress.     Appearance: Normal appearance. She is not ill-appearing or toxic-appearing.   HENT:      Head: Normocephalic and atraumatic.      Right Ear: Tympanic membrane normal.      Left Ear: Tympanic membrane normal.      Nose: Nose normal. No congestion or rhinorrhea.   Cardiovascular:      Rate and Rhythm: Normal rate and regular rhythm.      Pulses: Normal pulses.      Heart sounds: Normal heart sounds. No murmur heard.  Pulmonary:      Effort: Pulmonary effort is normal. No respiratory distress.      Breath sounds: Normal breath sounds. No wheezing.   Skin:     General: Skin is warm and dry.      Capillary Refill: Capillary refill takes less than 2 seconds.      Comments: 1.5 cm cystic lesion on her scalp right side     Neurological:      Mental Status: She is alert and oriented to person, place, and time.   Psychiatric:         Attention and Perception: Attention normal.         Mood and Affect: Mood is depressed. Affect is tearful.         Speech: Speech normal.         Behavior: Behavior normal.         Thought " Content: Thought content normal.         Cognition and Memory: Cognition and memory normal.      Comments: Initial screening for memory was normal, will perform MMSE at next visit.             Assessment:       1. Anxiety    2. Pilar cyst    3. Positive depression screening    4. Depression, unspecified depression type    5. Major depressive disorder, single episode, mild         Plan:           Anxiety  -     Ambulatory referral/consult to Psychiatry; Future; Expected date: 05/20/2024  -     diazePAM (VALIUM) 2 MG tablet; Take 1 tablet (2 mg total) by mouth every 8 (eight) hours as needed for Anxiety.  Dispense: 20 tablet; Refill: 0  Will refer patient to Psychiatry for anxiety and depression will increase her Rexulti from 0.5 mg to 1 mg, will give patient back half dose Valium previously on 5 mg patient instructed to take only for severe anxiety.  Will have patient follow-up in 1 month to see if her symptoms have improved I have recommended some lifestyle changes.  Patient should get 8 hours of sleep 5 servings of fruits and vegetables a day, 64 oz of water a day, 150 minutes of moderate intensity aerobic activity a week, I have made recommendations about where this been her spare time during the day she should get involved in activities, ([place] on [facility] [place], [employer], [employer], food pantry, start a regular walking program)    Pilar cyst  -     Ambulatory referral/consult to Dermatology; Future; Expected date: 05/20/2024    Positive depression screening  Comments:  I have reviewed the positive depression score which warrants active treatment with psychotherapy and/or medications.  Orders:  -     brexpiprazole (REXULTI) 1 mg Tab; Take 1 tablet (1 mg total) by mouth once daily.  Dispense: 90 tablet; Refill: 1  -     Ambulatory referral/consult to Psychiatry; Future; Expected date: 05/20/2024    Depression, unspecified depression type  -     Ambulatory referral/consult to Psychiatry; Future;  Expected date: 05/20/2024    Major depressive disorder, single episode, mild                          I have used clinical judgement based on duration and functional status to consider definite necessity for treatment. Will change medications and refer to MH counseling

## 2024-05-15 NOTE — TELEPHONE ENCOUNTER
Spoke to daughter advised of the wait list. She would like patient added to the wait list. Offered resources, sent via my chart.

## 2024-05-24 ENCOUNTER — TELEPHONE (OUTPATIENT)
Dept: PSYCHIATRY | Facility: CLINIC | Age: 59
End: 2024-05-24
Payer: COMMERCIAL

## 2024-05-27 ENCOUNTER — PATIENT MESSAGE (OUTPATIENT)
Dept: FAMILY MEDICINE | Facility: CLINIC | Age: 59
End: 2024-05-27
Payer: COMMERCIAL

## 2024-05-27 NOTE — PROGRESS NOTES
"Outpatient Psychotherapy Initial Visit  2024    History of Presenting Illness:    Pt is a 59 y.o. female referred by Wilbert Newton MD for Anxiety, Depression. Patient was seen, examined and chart was reviewed. Patient reviewed and agreed to informed consent and limits of confidentiality.     Met with pt and sister Sridevi.     Pt discussed how she was raised by her mom (stay at home mom) and dad (worked as a ) in [place], [place].   Pt described parents as strict but stated childhood was overall good.   Pt stated she has 1 older sister (Sridevi who currently lives about 30 minutes away in [place], [place]), and they are 7 years apart.     Pt stated she  at age 23; pt stated she went from living at parents' house to living with . Pt reported she has never lived alone.    Sister Sridevi stated their mom passed away in  and dad passed away in .     Sister Sridevi stated "mom was similar to her (patient) now" after their father  and mom was living alone. Sridevi described mom as secluded to home, anxious, and depressed till mom was moved to [facility] Facility where mom's mood improved. Sridevi reported mom later developed Dementia and was moved to a memory care unit prior to death.      Pt reported she attended therapy "years ago;" pt unsure of exact date.   Denied previous inpatient psychiatric hospitalization.   Denied previous SA or self-harm.     Currently lives with  Maynor (together over 30 years - good relationship - supportive), daughter Suma, and 2 cats  -Pt stated her  works in [place], [place]; "works on 7 days then off 7 days." Pt expressed she is unsure what her  does exactly but believes it has something to do with offshore.  -Pt stated she has 2 daughters: Suma 26y/o and Rosie 31y/o (lives in [place] with 6y/o grandson)    Sister Sridevi discussed how Suma works often and has a boyfriend so she is "rarely" home.   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 Pt stated she did not find another job because she wanted to be around for daughter Suam's senior year of high school.     Pt discussed how when her  is home from work, she usually does household chores and spends time with ; pt reported she will take a ride with  when he goes to care for his father (pt's father-in-law) who lives in [place], [place].     Pt discussed feeling lonely when her  is away at work for 7 days in a row; "Suma goes to work and I'm home by myself."   Sister Sridevi stated pt and sister spend on average 1 day a week together.    Pt stated "I don't feel like me." Pt reported being unable to have fun lately or feel rosita.   Pt stated she used to like watching TV and "getting stuff done around my house."   Sister Sridevi described pt as a caregiver with "empty nest syndrome."    Pt denied having any hobbies, coping skills, or engaging in any exercise.     Pt identified as Hinduism but stated she is not currently attending Sikhism.     Pt denied current alcohol, illicit substance, and/or tobacco usage.      Pt denied any trauma/abuse.      Pt stated her goals for therapy are to "stop feeling like this." Pt stated her biggest stressor is that she does not want to be alone; sister Sridevi reported pt is afraid to drive a car.     Discussed journaling; pt amenable.     Current symptoms:  Depression: dysphoric mood, anhedonia, fatigue, difficulty concentrating, and psychomotor retardation , low energy  Anxiety: excessive worrying, restlessness, and nervousness.  Sleep:  denies issues .  Bonnie:  denies.  Psychosis: denies .    Engaged in rapport building, psychoeducation, and goal setting.  Pt " "goals are to enhance coping skills, process feelings. Pt would benefit from behavior modification, insight oriented, interactive, and supportive therapies.  Pt receptive to psychotherapy. Assisted with scheduling follow ups.    Suicidal Ideation and Risk:   Pt denied current or history of related symptoms: yes    Alexander-Suicide Severity Rating Scale  In the last two weeks     1. Wish to be Dead: Have you ever wished you were dead or not alive anymore, or wish to fall asleep and not wake up?: No  2. Suicidal Thoughts: Have you had any thoughts of killing yourself?: No  3. Suicidal Thoughts with Method (withoutSpecific Plan or Intent to Act): Have you been thinking about how you might kill yourself? : No  4. Suicidal Intent (without Specific Plan): Have you had these thoughts and had some intention of acting on them?: No  5. Suicide Intent with Specific Plan: Have you started to work out or worked out the details of how to kill yourself? Do you intend to carry out this plan?: No  6. Suicide Behavior Question: Have you ever done anything, started to do anything, or prepare to do anything to end your life?: No  If "Yes" to question 6: How long ago did you do any of these?: Between a week and a year ago? N/A      Homicidal/Violent Ideation and Risk:   Pt denied current or history of related symptoms: yes  Patient advised to call [phone] or present the the nearest ED if they experience suicidal or homicidal ideation, plan or intent.      Past Medical History:   Diagnosis Date    Personal history of colonic polyps 06/05/2023         Current Outpatient Medications:     brexpiprazole (REXULTI) 1 mg Tab, Take 1 tablet (1 mg total) by mouth once daily., Disp: 90 tablet, Rfl: 1    diazePAM (VALIUM) 2 MG tablet, Take 1 tablet (2 mg total) by mouth every 8 (eight) hours as needed for Anxiety., Disp: 20 tablet, Rfl: 0    EScitalopram oxalate (LEXAPRO) 20 MG tablet, Take 1 tablet (20 mg total) by mouth every evening., Disp: 90 " tablet, Rfl: 1    Clinical Assessment:   Identified symptoms to address in tx:   Depression  Anxiety    Ability to adhere to plan:  guarded    Rationale for employing these interactive techniques: Applicable to diagnosis     Diagnosis(es):   1. Anxiety disorder, unspecified type  Ambulatory referral/consult to Psychiatry      2. Depression, unspecified depression type  Ambulatory referral/consult to Psychiatry          Plan   Treatment Goals:  Specify outcomes written in observable, behavioral terms:   Anxiety: acquiring relapse prevention skills, reducing negative automatic thoughts, reducing physical symptoms of anxiety, and reducing time spent worrying (<30 minutes/day)  Depression: acquiring relapse prevention skills, increasing energy, increasing interest in usual activities, increasing motivation, increasing self-reward for positive behaviors (one/day), increasing self-reward for positive thoughts (one/day), increasing social contacts (three/week), and reducing fatigue    Treatment Plan/Recommendations:   Medication Management: Continue current medications.  The treatment plan and follow up plan were reviewed with the patient.           Pt is to attend supportive psychotherapy sessions.     This author reviewed limits to confidentiality and this author's collaboration with pt's physician. Pt indicated understanding and denied any questions.    Return to Clinic: as scheduled  Counseling time: 60    -Call to report any worsening of symptoms or problems associated with medication.  - Pt instructed to go to ER if thoughts of harming self or others arise.   -Supportive therapy and psychoeducation provided  -Pt instructed to call clinic, 911 or go to nearest emergency room if sxs worsen or pt is in crisis. The pt expresses understanding.     Each patient to whom he or she provides medical services by telemedicine is:  (1) informed of the relationship between the physician and patient and the respective role of any  other health care provider with respect to management of the patient; and (2) notified that he or she may decline to receive medical services by telemedicine and may withdraw from such care at any time.

## 2024-05-31 ENCOUNTER — OFFICE VISIT (OUTPATIENT)
Dept: PSYCHIATRY | Facility: CLINIC | Age: 59
End: 2024-05-31
Payer: COMMERCIAL

## 2024-05-31 DIAGNOSIS — F41.9 ANXIETY DISORDER, UNSPECIFIED TYPE: Primary | ICD-10-CM

## 2024-05-31 DIAGNOSIS — F32.A DEPRESSION, UNSPECIFIED DEPRESSION TYPE: ICD-10-CM

## 2024-05-31 PROCEDURE — 99999 PR PBB SHADOW E&M-EST. PATIENT-LVL I: CPT | Mod: PBBFAC,,,

## 2024-05-31 PROCEDURE — 90791 PSYCH DIAGNOSTIC EVALUATION: CPT | Mod: S$GLB,,,

## 2024-06-07 ENCOUNTER — PATIENT MESSAGE (OUTPATIENT)
Dept: PSYCHIATRY | Facility: CLINIC | Age: 59
End: 2024-06-07
Payer: COMMERCIAL

## 2024-06-11 ENCOUNTER — TELEPHONE (OUTPATIENT)
Dept: FAMILY MEDICINE | Facility: CLINIC | Age: 59
End: 2024-06-11
Payer: COMMERCIAL

## 2024-06-11 NOTE — TELEPHONE ENCOUNTER
Patients daughter called stating that since seeing you the patient has gone from bad to worse. They do not think that her medication is working, she can't be left alone, and is no longer driving herself. She stated that she does occasionally take a valium but not too often. She wanted to know if there was any type of labs that could be checked that may be causing the  difference. The daughter did state that she has started seeing a psychiatrist but they are trying to get her to be seen by a provider at the behavioral hospital in [place]. The patient does have an appointment to see you on Monday. The daughter was concerned that it may possibly be a hormonal imbalance which was sent to you in a previous message.  Please advise.

## 2024-06-17 ENCOUNTER — OFFICE VISIT (OUTPATIENT)
Dept: FAMILY MEDICINE | Facility: CLINIC | Age: 59
End: 2024-06-17
Payer: COMMERCIAL

## 2024-06-17 VITALS
HEART RATE: 104 BPM | WEIGHT: 191.38 LBS | BODY MASS INDEX: 35.22 KG/M2 | OXYGEN SATURATION: 97 % | DIASTOLIC BLOOD PRESSURE: 83 MMHG | HEIGHT: 62 IN | SYSTOLIC BLOOD PRESSURE: 125 MMHG

## 2024-06-17 DIAGNOSIS — F41.9 ANXIETY: Primary | ICD-10-CM

## 2024-06-17 DIAGNOSIS — Z13.31 POSITIVE DEPRESSION SCREENING: ICD-10-CM

## 2024-06-17 PROCEDURE — 3074F SYST BP LT 130 MM HG: CPT | Mod: CPTII,S$GLB,, | Performed by: FAMILY MEDICINE

## 2024-06-17 PROCEDURE — 99213 OFFICE O/P EST LOW 20 MIN: CPT | Mod: S$GLB,,, | Performed by: FAMILY MEDICINE

## 2024-06-17 PROCEDURE — 99999 PR PBB SHADOW E&M-EST. PATIENT-LVL III: CPT | Mod: PBBFAC,,, | Performed by: FAMILY MEDICINE

## 2024-06-17 PROCEDURE — 3008F BODY MASS INDEX DOCD: CPT | Mod: CPTII,S$GLB,, | Performed by: FAMILY MEDICINE

## 2024-06-17 PROCEDURE — 3079F DIAST BP 80-89 MM HG: CPT | Mod: CPTII,S$GLB,, | Performed by: FAMILY MEDICINE

## 2024-06-17 RX ORDER — CLONAZEPAM 0.5 MG/1
0.5 TABLET ORAL 2 TIMES DAILY PRN
Qty: 60 TABLET | Refills: 0 | Status: SHIPPED | OUTPATIENT
Start: 2024-06-17 | End: 2025-06-17

## 2024-06-17 NOTE — PROGRESS NOTES
SUBJECTIVE:    Patient ID: Tammy Yao is a 59 y.o. female.    Chief Complaint: Anxiety  57 yo female (accompanied by her  today) who was seen last month for worsening depression/anxiety, this had seemed to start suddenly she is very anxious about being alone, and her  works out of town 14 days a month. At last visit we increased her Rexulti from .5mg to 1mg and this appears to made things worse and she is very anxious in clinic today, with trembling hands ( almost uncontrollable). She has started seeing a therapist in [place], we discussed seeing a psychiatrist.    In the past she had been on daily Valium, I refill at last visit to use only with panic attacks, pt seems to think this calms her down.     ,       Significant Past medical history  Depression: Lexapro 20 mg, Rexulti 1mg will decrease   Insomnia: Diazepam 2 mg     Specialists  Gyn : Dr JERRY Paredes  GI: Dr Heredia     Smoke:None  ETOH: None  Exercise: None    Depression  Visit Type: follow-up  Patient presents with the following symptoms: confusion, decreased concentration, depressed mood, excessive worry, feelings of hopelessness, memory impairment, nervousness/anxiety and panic.  Patient is not experiencing: anhedonia, chest pain, choking sensation, compulsions, palpitations, suicidal ideas, weight gain and weight loss.  Frequency of symptoms: constantly   Severity: severe     Anxiety  Presents for follow-up visit. Symptoms include confusion, decreased concentration, depressed mood, excessive worry, nervous/anxious behavior and panic. Patient reports no chest pain, compulsions, dysphagia, palpitations or suicidal ideas. Symptoms occur constantly. The severity of symptoms is severe.             Past Medical History:   Diagnosis Date    Personal history of colonic polyps 06/05/2023     Social History     Socioeconomic History    Marital status:    Tobacco Use    Smoking status: Never    Smokeless tobacco: Never   Substance and  Sexual Activity    Alcohol use: Yes     Comment: Occ    Drug use: Never    Sexual activity: Yes     Partners: Male     Social Determinants of Health     Financial Resource Strain: Low Risk  (5/30/2024)    Overall Financial Resource Strain (CARDIA)     Difficulty of Paying Living Expenses: Not very hard   Food Insecurity: No Food Insecurity (5/30/2024)    Hunger Vital Sign     Worried About Running Out of Food in the Last Year: Never true     Ran Out of Food in the Last Year: Never true   Transportation Needs: No Transportation Needs (5/12/2024)    PRAPARE - Transportation     Lack of Transportation (Medical): No     Lack of Transportation (Non-Medical): No   Physical Activity: Insufficiently Active (5/30/2024)    Exercise Vital Sign     Days of Exercise per Week: 1 day     Minutes of Exercise per Session: 20 min   Stress: Stress Concern Present (5/30/2024)    [employer] [place] [employer] - Occupational Stress Questionnaire     Feeling of Stress : Very much   Housing Stability: Unknown (5/30/2024)    Housing Stability Vital Sign     Unable to Pay for Housing in the Last Year: No     No past surgical history on file.  Family History   Problem Relation Name Age of Onset    Diabetes Mother      Dementia Mother      Cancer Father      Diabetes Maternal Grandmother      Depression Maternal Grandmother       Current Outpatient Medications   Medication Sig Dispense Refill    brexpiprazole (REXULTI) 1 mg Tab Take 1 tablet (1 mg total) by mouth once daily. 90 tablet 1    diazePAM (VALIUM) 2 MG tablet Take 1 tablet (2 mg total) by mouth every 8 (eight) hours as needed for Anxiety. 20 tablet 0    EScitalopram oxalate (LEXAPRO) 20 MG tablet Take 1 tablet (20 mg total) by mouth every evening. 90 tablet 1    clonazePAM (KLONOPIN) 0.5 MG tablet Take 1 tablet (0.5 mg total) by mouth 2 (two) times daily as needed for Anxiety. 60 tablet 0     No current facility-administered medications for this visit.     Review of  "patient's allergies indicates:  No Known Allergies    Review of Systems   Constitutional:  Positive for activity change. Negative for unexpected weight change, weight gain and weight loss.   HENT:  Negative for hearing loss, rhinorrhea and trouble swallowing.    Eyes:  Negative for discharge and visual disturbance.   Respiratory:  Negative for choking, chest tightness and wheezing.    Cardiovascular:  Negative for chest pain and palpitations.   Gastrointestinal:  Negative for blood in stool, constipation, diarrhea and vomiting.   Endocrine: Negative for polydipsia and polyuria.   Genitourinary:  Negative for difficulty urinating, dysuria, hematuria and menstrual problem.   Musculoskeletal:  Negative for arthralgias, joint swelling and neck pain.   Neurological:  Negative for weakness and headaches.   Psychiatric/Behavioral:  Positive for confusion, decreased concentration, depression and dysphoric mood. Negative for suicidal ideas. The patient is nervous/anxious.           Blood pressure 125/83, pulse 104, height 5' 2" (1.575 m), weight 86.8 kg (191 lb 6.4 oz), SpO2 97%. Body mass index is 35.01 kg/m².   Objective:      Physical Exam  Vitals reviewed.   Constitutional:       General: She is not in acute distress.     Appearance: Normal appearance. She is obese. She is not ill-appearing or toxic-appearing.   HENT:      Head: Normocephalic and atraumatic.      Mouth/Throat:      Mouth: Mucous membranes are moist.      Pharynx: No oropharyngeal exudate or posterior oropharyngeal erythema.   Eyes:      General: No scleral icterus.     Conjunctiva/sclera: Conjunctivae normal.      Pupils: Pupils are equal, round, and reactive to light.   Cardiovascular:      Rate and Rhythm: Regular rhythm. Tachycardia present.      Heart sounds: No murmur heard.  Pulmonary:      Effort: Pulmonary effort is normal. No respiratory distress.      Breath sounds: Normal breath sounds. No wheezing.   Skin:     Capillary Refill: Capillary " refill takes less than 2 seconds.   Neurological:      Mental Status: She is alert and oriented to person, place, and time.   Psychiatric:         Attention and Perception: She is inattentive.         Mood and Affect: Mood is anxious. Affect is labile and tearful.         Speech: Speech normal.         Behavior: Behavior is withdrawn.         Thought Content: Thought content does not include suicidal ideation. Thought content does not include homicidal or suicidal plan.             Assessment:       1. Anxiety    2. Positive depression screening         Plan:           Anxiety  -     Ambulatory referral/consult to Psychiatry; Future; Expected date: 06/24/2024  -     TSH; Future; Expected date: 06/17/2024  -     CBC W/ AUTO DIFFERENTIAL; Future; Expected date: 06/17/2024  -     COMPREHENSIVE METABOLIC PANEL; Future; Expected date: 06/17/2024  Will have her decrease the rexulti to .5 mg do to the trembling hands and restless appearence ( unsure if this does not represent akthesia), will keep the lexapro at 20mg and add daily clonazapam BID she will stop the Valium. Will place a referral to Psych for evaluation. I have sked the  to call back with the name of a Psychiatrist in the same facility as her therapist.    Positive depression screening  Comments:  I have reviewed the positive depression score which warrants active treatment with psychotherapy and/or medications.    Other orders  -     clonazePAM (KLONOPIN) 0.5 MG tablet; Take 1 tablet (0.5 mg total) by mouth 2 (two) times daily as needed for Anxiety.  Dispense: 60 tablet; Refill: 0                          I have reviewed the positive depression score which warrants active treatment with psychotherapy and/or medications. Have referred to Psych and will adjust her medications.

## 2024-06-18 ENCOUNTER — LAB VISIT (OUTPATIENT)
Dept: LAB | Facility: HOSPITAL | Age: 59
End: 2024-06-18
Attending: FAMILY MEDICINE
Payer: COMMERCIAL

## 2024-06-18 DIAGNOSIS — F41.9 ANXIETY: ICD-10-CM

## 2024-06-18 LAB
ALBUMIN SERPL BCP-MCNC: 3.6 G/DL (ref 3.5–5.2)
ALP SERPL-CCNC: 77 U/L (ref 55–135)
ALT SERPL W/O P-5'-P-CCNC: 18 U/L (ref 10–44)
ANION GAP SERPL CALC-SCNC: 6 MMOL/L (ref 8–16)
AST SERPL-CCNC: 18 U/L (ref 10–40)
BASOPHILS # BLD AUTO: 0.02 K/UL (ref 0–0.2)
BASOPHILS NFR BLD: 0.3 % (ref 0–1.9)
BILIRUB SERPL-MCNC: 0.4 MG/DL (ref 0.1–1)
BUN SERPL-MCNC: 8 MG/DL (ref 8–23)
CALCIUM SERPL-MCNC: 9.5 MG/DL (ref 8.7–10.5)
CHLORIDE SERPL-SCNC: 107 MMOL/L (ref 95–110)
CO2 SERPL-SCNC: 24 MMOL/L (ref 23–29)
CREAT SERPL-MCNC: 0.8 MG/DL (ref 0.5–1.4)
DIFFERENTIAL METHOD BLD: NORMAL
EOSINOPHIL # BLD AUTO: 0.1 K/UL (ref 0–0.5)
EOSINOPHIL NFR BLD: 1 % (ref 0–8)
ERYTHROCYTE [DISTWIDTH] IN BLOOD BY AUTOMATED COUNT: 13.2 % (ref 11.5–14.5)
EST. GFR  (NO RACE VARIABLE): >60 ML/MIN/1.73 M^2
GLUCOSE SERPL-MCNC: 90 MG/DL (ref 70–110)
HCT VFR BLD AUTO: 41.9 % (ref 37–48.5)
HGB BLD-MCNC: 13.7 G/DL (ref 12–16)
IMM GRANULOCYTES # BLD AUTO: 0.02 K/UL (ref 0–0.04)
IMM GRANULOCYTES NFR BLD AUTO: 0.3 % (ref 0–0.5)
LYMPHOCYTES # BLD AUTO: 2.1 K/UL (ref 1–4.8)
LYMPHOCYTES NFR BLD: 29.8 % (ref 18–48)
MCH RBC QN AUTO: 30.5 PG (ref 27–31)
MCHC RBC AUTO-ENTMCNC: 32.7 G/DL (ref 32–36)
MCV RBC AUTO: 93 FL (ref 82–98)
MONOCYTES # BLD AUTO: 0.5 K/UL (ref 0.3–1)
MONOCYTES NFR BLD: 7.7 % (ref 4–15)
NEUTROPHILS # BLD AUTO: 4.2 K/UL (ref 1.8–7.7)
NEUTROPHILS NFR BLD: 60.9 % (ref 38–73)
NRBC BLD-RTO: 0 /100 WBC
PLATELET # BLD AUTO: 254 K/UL (ref 150–450)
PMV BLD AUTO: 10.8 FL (ref 9.2–12.9)
POTASSIUM SERPL-SCNC: 3.6 MMOL/L (ref 3.5–5.1)
PROT SERPL-MCNC: 6.8 G/DL (ref 6–8.4)
RBC # BLD AUTO: 4.49 M/UL (ref 4–5.4)
SODIUM SERPL-SCNC: 137 MMOL/L (ref 136–145)
TSH SERPL DL<=0.005 MIU/L-ACNC: 1.81 UIU/ML (ref 0.4–4)
WBC # BLD AUTO: 6.89 K/UL (ref 3.9–12.7)

## 2024-06-18 PROCEDURE — 80053 COMPREHEN METABOLIC PANEL: CPT | Performed by: FAMILY MEDICINE

## 2024-06-18 PROCEDURE — 85025 COMPLETE CBC W/AUTO DIFF WBC: CPT | Performed by: FAMILY MEDICINE

## 2024-06-18 PROCEDURE — 84443 ASSAY THYROID STIM HORMONE: CPT | Performed by: FAMILY MEDICINE

## 2024-06-18 PROCEDURE — 36415 COLL VENOUS BLD VENIPUNCTURE: CPT | Performed by: FAMILY MEDICINE

## 2024-06-27 LAB
HUMAN PAPILLOMAVIRUS (HPV): NORMAL
PAP RECOMMENDATION EXT: NORMAL
PAP SMEAR: NORMAL

## 2024-07-16 ENCOUNTER — OFFICE VISIT (OUTPATIENT)
Dept: FAMILY MEDICINE | Facility: CLINIC | Age: 59
End: 2024-07-16
Payer: COMMERCIAL

## 2024-07-16 ENCOUNTER — PATIENT MESSAGE (OUTPATIENT)
Dept: FAMILY MEDICINE | Facility: CLINIC | Age: 59
End: 2024-07-16

## 2024-07-16 VITALS
TEMPERATURE: 99 F | HEIGHT: 62 IN | RESPIRATION RATE: 14 BRPM | WEIGHT: 187.38 LBS | BODY MASS INDEX: 34.48 KG/M2 | OXYGEN SATURATION: 97 % | HEART RATE: 104 BPM | DIASTOLIC BLOOD PRESSURE: 80 MMHG | SYSTOLIC BLOOD PRESSURE: 126 MMHG

## 2024-07-16 DIAGNOSIS — Z13.31 POSITIVE DEPRESSION SCREENING: ICD-10-CM

## 2024-07-16 DIAGNOSIS — F41.9 ANXIETY: Primary | ICD-10-CM

## 2024-07-16 PROCEDURE — 3079F DIAST BP 80-89 MM HG: CPT | Mod: CPTII,S$GLB,, | Performed by: FAMILY MEDICINE

## 2024-07-16 PROCEDURE — 3074F SYST BP LT 130 MM HG: CPT | Mod: CPTII,S$GLB,, | Performed by: FAMILY MEDICINE

## 2024-07-16 PROCEDURE — 3008F BODY MASS INDEX DOCD: CPT | Mod: CPTII,S$GLB,, | Performed by: FAMILY MEDICINE

## 2024-07-16 PROCEDURE — 99214 OFFICE O/P EST MOD 30 MIN: CPT | Mod: S$GLB,,, | Performed by: FAMILY MEDICINE

## 2024-07-16 PROCEDURE — 99999 PR PBB SHADOW E&M-EST. PATIENT-LVL IV: CPT | Mod: PBBFAC,,, | Performed by: FAMILY MEDICINE

## 2024-07-16 PROCEDURE — 1159F MED LIST DOCD IN RCRD: CPT | Mod: CPTII,S$GLB,, | Performed by: FAMILY MEDICINE

## 2024-07-16 RX ORDER — CLONAZEPAM 0.5 MG/1
0.5 TABLET ORAL 2 TIMES DAILY PRN
Qty: 60 TABLET | Refills: 0 | Status: SHIPPED | OUTPATIENT
Start: 2024-07-16 | End: 2025-07-16

## 2024-07-16 RX ORDER — SERTRALINE HYDROCHLORIDE 25 MG/1
25 TABLET, FILM COATED ORAL
COMMUNITY
Start: 2024-06-27

## 2024-07-16 NOTE — PROGRESS NOTES
SUBJECTIVE:    Patient ID: Tammy Yao is a 59 y.o. female.    Chief Complaint: Anxiety (Follow up )  59 yo female here today to follow up on her anxiety, pt at last visit had follow up with psychiatry and counseling, she saw the psychiatrist he has changed her medication leaving her on clonazepam and switching her from Lexipro to Zoloft, she has not been able to get follow up visits with either Psychiatry or Therapy for follow up visits.    Since switching her medication both her and her  agree that she has improved. She continues to have anxiety, she won't leave home by her self and must be accompanied by some one, she is sleeping close to 10 hous a day, she has ot started exercising, she has not developed any hobbies, she is not going to [employer]. Today she is accompanied by her  he states she has improved at last visit he was concerned about a medical condition causing her anxiety, today he is concerned about possible dementia. I explained that dementia is not an acute illness, I completed a [facility] and she scored 24/30, her missed answers were Day of the week, Month and  date. Pt also david not perform well with her 3 part command, thses misses appear to be consistent with not paying attention. I will attempt to get her into Therapy, unsure if her symptoms are consistent with true anxiety or depression or if there may be secondary gain.       Significant Past medical history  Depression: Lexapro 20 mg, Rexulti 0.5 mg (will increase)   Insomnia: Diazepam 5 mg     Specialists  Gyn : Dr JERRY Paredes  GI: Dr Heredia     Smoke:None  ETOH: None  Exercise: None    HPI      Past Medical History:   Diagnosis Date    Personal history of colonic polyps 06/05/2023     Social History     Socioeconomic History    Marital status:    Tobacco Use    Smoking status: Never    Smokeless tobacco: Never   Substance and Sexual Activity    Alcohol use: Yes     Comment: Occ    Drug use: Never    Sexual  activity: Yes     Partners: Male     Social Determinants of Health     Financial Resource Strain: Low Risk  (5/30/2024)    Overall Financial Resource Strain (CARDIA)     Difficulty of Paying Living Expenses: Not very hard   Food Insecurity: No Food Insecurity (5/30/2024)    Hunger Vital Sign     Worried About Running Out of Food in the Last Year: Never true     Ran Out of Food in the Last Year: Never true   Transportation Needs: No Transportation Needs (5/12/2024)    PRAPARE - Transportation     Lack of Transportation (Medical): No     Lack of Transportation (Non-Medical): No   Physical Activity: Insufficiently Active (5/30/2024)    Exercise Vital Sign     Days of Exercise per Week: 1 day     Minutes of Exercise per Session: 20 min   Stress: Stress Concern Present (5/30/2024)    [employer] [place] [employer] - Occupational Stress Questionnaire     Feeling of Stress : Very much   Housing Stability: Unknown (5/30/2024)    Housing Stability Vital Sign     Unable to Pay for Housing in the Last Year: No     History reviewed. No pertinent surgical history.  Family History   Problem Relation Name Age of Onset    Diabetes Mother      Dementia Mother      Cancer Father      Diabetes Maternal Grandmother      Depression Maternal Grandmother       Current Outpatient Medications   Medication Sig Dispense Refill    sertraline (ZOLOFT) 25 MG tablet Take 25 mg by mouth.      clonazePAM (KLONOPIN) 0.5 MG tablet Take 1 tablet (0.5 mg total) by mouth 2 (two) times daily as needed for Anxiety. 60 tablet 0     No current facility-administered medications for this visit.     Review of patient's allergies indicates:  No Known Allergies    Review of Systems   Constitutional:  Positive for activity change. Negative for diaphoresis, fatigue, fever and unexpected weight change.   HENT:  Negative for congestion, hearing loss, postnasal drip, rhinorrhea, sinus pressure, sinus pain and trouble swallowing.    Eyes:  Negative for  "discharge and visual disturbance.   Respiratory:  Negative for chest tightness and wheezing.    Cardiovascular:  Negative for chest pain and palpitations.   Gastrointestinal:  Negative for blood in stool, constipation, diarrhea and vomiting.   Endocrine: Negative for polydipsia and polyuria.   Genitourinary:  Negative for difficulty urinating, dysuria, hematuria and menstrual problem.   Musculoskeletal:  Negative for arthralgias, joint swelling and neck pain.   Neurological:  Negative for weakness and headaches.   Psychiatric/Behavioral:  Positive for confusion. Negative for dysphoric mood. The patient is nervous/anxious.           Blood pressure 126/80, pulse 104, temperature 98.7 °F (37.1 °C), temperature source Oral, resp. rate 14, height 5' 2" (1.575 m), weight 85 kg (187 lb 6.4 oz), SpO2 97%. Body mass index is 34.28 kg/m².   Objective:      Physical Exam  Vitals reviewed.   Constitutional:       General: She is not in acute distress.     Appearance: Normal appearance. She is obese. She is not toxic-appearing.   HENT:      Head: Normocephalic and atraumatic.      Right Ear: Tympanic membrane normal.      Left Ear: Tympanic membrane normal.   Cardiovascular:      Rate and Rhythm: Normal rate and regular rhythm.      Heart sounds: Murmur heard.   Pulmonary:      Effort: No respiratory distress.      Breath sounds: Normal breath sounds. No wheezing or rhonchi.   Skin:     General: Skin is warm and dry.      Capillary Refill: Capillary refill takes less than 2 seconds.   Neurological:      Mental Status: She is alert and oriented to person, place, and time.             Assessment:       1. Anxiety    2. Positive depression screening         Plan:           Anxiety  -     Ambulatory referral/consult to Psychiatry; Future; Expected date: 07/23/2024  -     clonazePAM (KLONOPIN) 0.5 MG tablet; Take 1 tablet (0.5 mg total) by mouth 2 (two) times daily as needed for Anxiety.  Dispense: 60 tablet; Refill: 0    Positive " depression screening  Comments:  I have reviewed the positive depression score which warrants active treatment with psychotherapy and/or medications.  Orders:  -     clonazePAM (KLONOPIN) 0.5 MG tablet; Take 1 tablet (0.5 mg total) by mouth 2 (two) times daily as needed for Anxiety.  Dispense: 60 tablet; Refill: 0

## 2024-07-18 ENCOUNTER — TELEPHONE (OUTPATIENT)
Dept: PSYCHIATRY | Facility: CLINIC | Age: 59
End: 2024-07-18
Payer: COMMERCIAL

## 2024-07-18 NOTE — TELEPHONE ENCOUNTER
Per patients message through portal:    Medication changes Prescribed by Dr.Tyler Abraham  Patient was taken completely taken off Rexulti  Weaned off of Lexapro 20 mg (10 mg for 7 days then 5 mg for 7 days then stop using)  Start Zoloft 25 mg    6/2724- Started Zoloft 25 mg and lowered Lexapro to 10 mg instead of 20 mg for 7 days at night.    7/4/24-Zoloft 25 mg and lowered Lexapro to 5 mg instead of 10 mg for 7  days at night.    7/11/24- Zoloft 25 mg only at night.

## 2024-07-18 NOTE — TELEPHONE ENCOUNTER
Pasha Rod: This patient is transitioning from [facility] in [place] to my care. Would you please schedule her for an initial with me? Let me know what transpires. Her PCP would like her to see me and she apparently agreed.     Called patient to schedule a new patient appointment with pasha Fernandes LCSW. No answer, left voice message, sent my chart message

## 2024-07-19 NOTE — TELEPHONE ENCOUNTER
Spoke to the patient's daughter and scheduled an in person new patient appointment with Tammy August LCSW for 07/22/2024 @ 1pm. Advised daughter of the location, contact phone number, to arrive 15 minutes early for the appointment, to bring ID, insurance card, informed of security presence and mandatory electronic search, and of the cancellation policy. Patient's daughter states understanding and no additional questions at this time.

## 2024-07-25 ENCOUNTER — OFFICE VISIT (OUTPATIENT)
Dept: PSYCHIATRY | Facility: CLINIC | Age: 59
End: 2024-07-25
Payer: COMMERCIAL

## 2024-07-25 DIAGNOSIS — F41.9 ANXIETY: ICD-10-CM

## 2024-07-25 DIAGNOSIS — F41.9 ANXIETY DISORDER, UNSPECIFIED TYPE: Primary | ICD-10-CM

## 2024-07-25 DIAGNOSIS — F32.A DEPRESSION, UNSPECIFIED DEPRESSION TYPE: ICD-10-CM

## 2024-07-25 PROCEDURE — 99999 PR PBB SHADOW E&M-EST. PATIENT-LVL II: CPT | Mod: PBBFAC,,, | Performed by: SOCIAL WORKER

## 2024-07-25 PROCEDURE — 90791 PSYCH DIAGNOSTIC EVALUATION: CPT | Mod: S$GLB,,, | Performed by: SOCIAL WORKER

## 2024-07-25 NOTE — PROGRESS NOTES
"Psychiatry Initial Visit (PhD/LCSW)  Diagnostic Interview - CPT 25335    Date: 7/25/2024    Site: [facility]    Referral source: Wilbert Newton MD    Clinical status of patient: Outpatient    Tammy Yao, a 59 y.o. female, for initial evaluation visit.  Met with patient.    Chief complaint/reason for encounter: depression and anxiety    History of present illness: "I don't know when it started"  "I want to go back to being like I used to be"  "I am afraid of being alone"  "I want someone with me"     Pain: noncontributory    Symptoms:   Mood: depressed mood, diminished interest, poor concentration, and social isolation  Anxiety: decreased memory, excessive anxiety/worry, restlessness/keyed up, and muscle tension  Substance abuse: denied  Cognitive functioning: denied  Health behaviors: noncontributory    Psychiatric history: psychotropic management by PCP    Medical history:   Past Medical History:   Diagnosis Date    Personal history of colonic polyps 06/05/2023       Medications:    Current Outpatient Medications:     clonazePAM (KLONOPIN) 0.5 MG tablet, Take 1 tablet (0.5 mg total) by mouth 2 (two) times daily as needed for Anxiety., Disp: 60 tablet, Rfl: 0    sertraline (ZOLOFT) 25 MG tablet, Take 25 mg by mouth., Disp: , Rfl:     Family history of psychiatric illness:   Family History   Problem Relation Name Age of Onset    Diabetes Mother      Dementia Mother      Cancer Father      Diabetes Maternal Grandmother      Depression Maternal Grandmother         Social history (marriage, employment, etc.):   Tammy presents on time for today's initial.  Her  Maynor accompanies her.  She would like Maynor present however I ask to meet with her alone.  At first she is upset and resistant, however  encourages her to see clinician.  I note that she is periodically shaking, grabbing her legs and arms, appears distressed, speaks to being nervous, feels better when she is able to stand and explain that standing " "is okay.  She is 59 year old  female,  to  since .  She is unsure of his profession.  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  They have two daughters: Rosie who is 32 and Ewa who is 25.  Ewa resides with them and she works as a "little teacher".  They have one 5 year old grandson named Yoni (Rosie's son).   She was raised in [place] on [address].  Likes to cook Italian food and says her meatballs and spaghetti are very good.  She is one of two children:  Sridevi is her older sister.  She admits that Sridevi can be "bossy".  Both parents are .  Father was a  and predeceased her mother.  Believes mother suffered from depression.  Describes childhood as happy and denies hx of childhood abuse of any kind.  Denies any self-harming activities, attempted suicides, psych hospitalizations, street drugs or alcohol abuse and repeatedly states "I don't know" when ask why she is anxious, and what is her thinking, thoughts that are driving the anxiety.  Does admit that she fears "being alone" and wants someone to be with her.  Admits she is fearful of being in her home when  is away (he is away one week, returns home for one week, and then is off again).  Engaged Tammy in [employer] and does well.  Gave her a Breath stone.  Briefly met with her  Maynor (62) and explain when I want to see her again and what her homework is.  He appears kind and amenable to helping.  Will put in referral to Dr. Swan for assessment and med mgmt.  Tammy agrees to engage in therapy and have scheduled her for a follow up prior to her exiting the clinic.  Will see her as scheduled.   Social History     Tobacco Use    Smoking status: Never    Smokeless tobacco: Never   Substance Use Topics    Alcohol use: Yes     Comment: Occ    Drug use: Never       Current medications and drug " reactions (include OTC, herbal): see medication list     Strengths and liabilities: Strength: Patient accepts guidance/feedback, Strength: Patient has positive support network., Liability: Patient is dependent., Liability: Patient lacks coping skills.    Current Evaluation:     Mental Status Exam:  General Appearance:  age appropriate, well nourished, casually dressed, neatly groomed   Speech: normal tone, normal rate, normal pitch, normal volume, non-spontaneous      Level of Cooperation: cooperative      Thought Processes: normal and logical, concrete   Mood: anxious, sad      Thought Content: normal, no suicidality, no homicidality, delusions, or paranoia, ruminations   Affect: mood-congruent, sad, anxious   Orientation: Oriented to person, Oriented to place   Memory: recent >  intact, remote >  impaired   Attention Span & Concentration: Unable to gauge   Fund of General Knowledge: intact and appropriate to age and level of education   Abstract Reasoning: Unable to gauge   Judgment & Insight: poor     Language  word-finding difficulty     Diagnostic Impression - Plan:       ICD-10-CM ICD-9-CM   1. Anxiety disorder, unspecified type  F41.9 300.00   2. Depression, unspecified depression type  F32.A 311       Plan:individual psychotherapy    Return to Clinic: as scheduled    Length of Service (minutes): 45

## 2024-08-05 ENCOUNTER — TELEPHONE (OUTPATIENT)
Dept: PSYCHIATRY | Facility: CLINIC | Age: 59
End: 2024-08-05
Payer: COMMERCIAL

## 2024-08-13 ENCOUNTER — OFFICE VISIT (OUTPATIENT)
Dept: PSYCHIATRY | Facility: CLINIC | Age: 59
End: 2024-08-13
Payer: COMMERCIAL

## 2024-08-13 DIAGNOSIS — F41.9 ANXIETY DISORDER, UNSPECIFIED TYPE: Primary | ICD-10-CM

## 2024-08-13 DIAGNOSIS — F32.A DEPRESSION, UNSPECIFIED DEPRESSION TYPE: ICD-10-CM

## 2024-08-13 PROCEDURE — 90837 PSYTX W PT 60 MINUTES: CPT | Mod: S$GLB,,, | Performed by: SOCIAL WORKER

## 2024-08-13 NOTE — PROGRESS NOTES
"Individual Psychotherapy (PhD/LCSW)    8/13/2024    Site:  [facility]        Therapeutic Intervention: Met with patient and spouse.  Outpatient - Supportive psychotherapy 45 min - CPT Code 38026 and Outpatient - Interactive psychotherapy 45 min - CPT code 16417    Chief complaint/reason for encounter: depression and anxiety   Medical history:   Past Medical History:   Diagnosis Date    Personal history of colonic polyps 06/05/2023       Medications:    Current Outpatient Medications:     clonazePAM (KLONOPIN) 0.5 MG tablet, Take 1 tablet (0.5 mg total) by mouth 2 (two) times daily as needed for Anxiety., Disp: 60 tablet, Rfl: 0    sertraline (ZOLOFT) 25 MG tablet, Take 25 mg by mouth., Disp: , Rfl:     Family history of psychiatric illness:   Family History   Problem Relation Name Age of Onset    Diabetes Mother      Dementia Mother      Cancer Father      Diabetes Maternal Grandmother      Depression Maternal Grandmother         Social history (marriage, employment, etc.): Initial session on 7/25/2024:  Tammy presents on time for today's initial. Her  Maynor accompanies her. She would like Maynor present however I ask to meet with her alone. At first she is upset and resistant, however  encourages her to see clinician. I note that she is periodically shaking, grabbing her legs and arms, appears distressed, speaks to being nervous, feels better when she is able to stand and explain that standing is okay. She is 59 year old  female,  to  since 1985. She is unsure of his profession. 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 They have two daughters: Rosie who is 32 and Ewa who is 25. Ewa resides with them and she works as a "little teacher". They have one 5 year old grandson named Yoni (Rosie's son). She was raised in [place] on [address]. Likes to cook Italian food and " "says her meatballs and spaghetti are very good. She is one of two children: Sridevi is her older sister. She admits that Sridevi can be "bossy". Both parents are . Father was a  and predeceased her mother. Believes mother suffered from depression. Describes childhood as happy and denies hx of childhood abuse of any kind. Denies any self-harming activities, attempted suicides, psych hospitalizations, street drugs or alcohol abuse and repeatedly states "I don't know" when ask why she is anxious, and what is her thinking, thoughts that are driving the anxiety.  Does admit that she fears "being alone" and wants someone to be with her.  Admits she is fearful of being in her home when  is away (he is away one week, returns home for one week, and then is off again).  Engaged Tammy in DDB and does well. Gave her a Breath stone.  Briefly met with her  Maynor (60) and explain when I want to see her again and what her homework is. He appears kind and amenable to helping.  Will put in referral to Dr. Swan for assessment and med mgmt.  Tammy agrees to engage in therapy and have scheduled her for a follow up prior to her exiting the clinic. Will see her as scheduled.     Social History     Tobacco Use    Smoking status: Never    Smokeless tobacco: Never   Substance Use Topics    Alcohol use: Yes     Comment: Occ    Drug use: Never       Interval history and content of current session: Tammy presents today with  Vincent Washburn" for a mutual session.  She has good eye contact, speech is direct, behavior is agitated, nervous, jerky movements throughout the session, stands on occasion as experiences difficulty in remaining sitting, mood is highly anxious, although stable.  Denies suicidal or homicidal thoughts.  No self harming activities noted.   Maynor is able to engage in this session to help clinician have a better understanding of Tammy's condition, when it started and events noted at the " "start.  Mother and mother in law with whom she was very close both  within one year of each other.  Problems with oldest daughter Rosie where Tammy gave Rosie part of her inheritance from her mother's estate, roughly $50,000, to help Rosie keep her home (Rosie works part time and her fiancee is unemployed) may have contributed to this.  Worries surrounding youngest daughter Suma surrounding Suma's former fiancee also surfaced although both note that Suma is now engaged to a nice  from [place] and doing very well.  Maynor believes these events may have placed a part in Tammy's extreme anxious mood.  Tammy admits during the session that sometimes "I think thoughts that I should not think" however is not able to speak to what those thoughts are.  Explain thoughts, feeling and resulting behaviors.  Explain brain's functions relating to thoughts and emotions.  Appears to be listening.  Explain Challenge the Thought and first knowing what the thought is.  Signs releases so I may secure records from her psychiatrist who is [facility].  Also signs Involvement of Care form so I may communicate with her .  Will see her as scheduled.  Goals: take her meds (Zoloft will go from 50 to 100 mgs when she next sees Dr. Babatunde Abraham with [facility] in [place]; attend to therapy sessions with clinician are the two primary goals.   Will see her as scheduled.     Treatment plan:  Target symptoms: lack of focus, recurrent depression, anxiety , confusion, adjustment  Why chosen therapy is appropriate versus another modality: relevant to diagnosis, patient responds to this modality, evidence based practice  Outcome monitoring methods: self-report, observation, feedback from family  Therapeutic intervention type: behavior modifying psychotherapy, supportive psychotherapy    Risk parameters:  Patient reports no suicidal ideation  Patient reports no homicidal " ideation  Patient reports no self-injurious behavior  Patient reports no violent behavior    Verbal deficits: None    Patient's response to intervention:  The patient's response to intervention is accepting.    Progress toward goals and other mental status changes:  The patient's progress toward goals is fair .    Diagnosis:   1. Anxiety disorder, unspecified type    2. Depression, unspecified depression type        Plan:  individual psychotherapy and family psychotherapy    Return to clinic: as scheduled    Length of Service (minutes): 60

## 2024-08-26 ENCOUNTER — TELEPHONE (OUTPATIENT)
Dept: PSYCHIATRY | Facility: CLINIC | Age: 59
End: 2024-08-26
Payer: COMMERCIAL

## 2024-08-27 ENCOUNTER — OFFICE VISIT (OUTPATIENT)
Dept: PSYCHIATRY | Facility: CLINIC | Age: 59
End: 2024-08-27
Payer: COMMERCIAL

## 2024-08-27 DIAGNOSIS — F32.A DEPRESSION, UNSPECIFIED DEPRESSION TYPE: ICD-10-CM

## 2024-08-27 DIAGNOSIS — F41.9 ANXIETY DISORDER, UNSPECIFIED TYPE: Primary | ICD-10-CM

## 2024-08-27 PROCEDURE — 90837 PSYTX W PT 60 MINUTES: CPT | Mod: S$GLB,,, | Performed by: SOCIAL WORKER

## 2024-08-27 NOTE — PROGRESS NOTES
"Individual Psychotherapy (PhD/LCSW)    8/27/2024    Site:  [facility]        Therapeutic Intervention: Met with patient.  Outpatient - Supportive psychotherapy 45 min - CPT Code 90398 and Outpatient - Interactive psychotherapy 45 min - CPT code 02810    Chief complaint/reason for encounter: depression, anxiety, behavior, and interpersonal   Medical history:   Past Medical History:   Diagnosis Date    Personal history of colonic polyps 06/05/2023       Medications:    Current Outpatient Medications:     clonazePAM (KLONOPIN) 0.5 MG tablet, Take 1 tablet (0.5 mg total) by mouth 2 (two) times daily as needed for Anxiety., Disp: 60 tablet, Rfl: 0    sertraline (ZOLOFT) 25 MG tablet, Take 25 mg by mouth., Disp: , Rfl:     Family history of psychiatric illness:   Family History   Problem Relation Name Age of Onset    Diabetes Mother      Dementia Mother      Cancer Father      Diabetes Maternal Grandmother      Depression Maternal Grandmother         Social history (marriage, employment, etc.): Tammy presents on time for today's initial.  Her  Maynor accompanies her.  She would like Maynor present however I ask to meet with her alone.  At first she is upset and resistant, however  encourages her to see clinician.  I note that she is periodically shaking, grabbing her legs and arms, appears distressed, speaks to being nervous, feels better when she is able to stand and explain that standing is okay.  She is 59 year old  female,  to  since 1985.  She is unsure of his profession.  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  They have two daughters: Rosie who is 32 and Ewa who is 25.  Ewa resides with them and she works as a "little teacher".  They have one 5 year old grandson named Yoni (Rosie's son).   She was raised in [place] on [address].  Likes to cook Italian food and " "says her meatballs and spaghetti are very good.  She is one of two children:  Sridevi is her older sister.  She admits that Sridevi can be "bossy".  Both parents are .  Father was a  and predeceased her mother.  Believes mother suffered from depression.  Describes childhood as happy and denies hx of childhood abuse of any kind.  Denies any self-harming activities, attempted suicides, psych hospitalizations, street drugs or alcohol abuse and repeatedly states "I don't know" when ask why she is anxious, and what is her thinking, thoughts that are driving the anxiety.  Does admit that she fears "being alone" and wants someone to be with her.  Admits she is fearful of being in her home when  is away (he is away one week, returns home for one week, and then is off again).  Engaged Tammy in DDB and does well.  Gave her a Breath stone.  Briefly met with her  Maynor (60) and explain when I want to see her again and what her homework is.  He appears kind and amenable to helping.  Will put in referral to Dr. Swan for assessment and med mgmt.  Tammy agrees to engage in therapy and have scheduled her for a follow up prior to her exiting the clinic.  Will see her as scheduled.     Session on 2024:  Tammy presents today with  Vincent Washburn" for a mutual session. She has good eye contact, speech is direct, behavior is agitated, nervous, jerky movements throughout the session, stands on occasion as experiences difficulty in remaining sitting, mood is highly anxious, although stable. Denies suicidal or homicidal thoughts. No self harming activities noted.  Maynor is able to engage in this session to help clinician have a better understanding of Tammy's condition, when it started and events noted at the start. Mother and mother in law with whom she was very close both  within one year of each other. Problems with oldest daughter Rosie where Tammy gave Rosie part of her " "inheritance from her mother's estate, roughly $50,000, to help Rosie keep her home (Rosie works part time and her fiancee is unemployed) may have contributed to this. Worries surrounding youngest daughter Suma surrounding Suma's former emily also surfaced although both note that Suma is now engaged to a nice  from [place] and doing very well. Maynor believes these events may have played a part in Tammy's extreme anxious mood. Tammy admits during the session that sometimes "I think thoughts that I should not think" however is not able to speak to what those thoughts are. Explain thoughts, feelings and resulting behaviors. Explain brain's functions relating to thoughts and emotions. Appears to be listening. Explain Challenge the Thought and first knowing what the thought is. Signs releases so I may secure records from her psychiatrist who is outside of the [facility] system. Also signs Involvement of Care form so I may communicate with her . Will see her as scheduled. Goals: take her meds (Zoloft will go from 50 to 100 mgs when she next sees Dr. Babatunde Abraham with [facility] in [place]; attend to therapy sessions with clinician are the two primary goals. Will see her as scheduled.          Social History     Tobacco Use    Smoking status: Never    Smokeless tobacco: Never   Substance Use Topics    Alcohol use: Yes     Comment: Occ    Drug use: Never       Interval history and content of current session: Tammy presents with good eye contact, direct speech, thought processes intact, goal directed, oriented x3, behavior cooperative, less shaky movements, is smiling more, this time  Maynor stays in reception area, she is able to come into my clinic office by herself, she feels more comfortable as she accompanies me.  Engage her in the breath.  Give her a breath stone.  Review Thoughts, Feelings, Resulting Behaviors.  Speaks to not giving Rosie any more monies " from her inheritance.  Question her whether she expects Rosie to repay her, she states she hopes she will however not clear.  If she does, urge her to accept same.  We speak to Ewa, and how well she is doing and she agrees.  Children are a major concern for Tammy.  We speak to the deaths of her mother and mother in law coming in such close proximity to each other and how jarring this much have been for her and she agrees.  Escort her back and she is calmer, will see her as scheduled.  She is making progress.      Treatment plan:  Target symptoms: depression, anxiety   Why chosen therapy is appropriate versus another modality: relevant to diagnosis, patient responds to this modality, evidence based practice  Outcome monitoring methods: self-report, observation  Therapeutic intervention type: insight oriented psychotherapy, behavior modifying psychotherapy, supportive psychotherapy    Risk parameters:  Patient reports no suicidal ideation  Patient reports no homicidal ideation  Patient reports no self-injurious behavior  Patient reports no violent behavior    Verbal deficits: None    Patient's response to intervention:  The patient's response to intervention is accepting.    Progress toward goals and other mental status changes:  The patient's progress toward goals is good.    Diagnosis:   1. Anxiety disorder, unspecified type    2. Depression, unspecified depression type        Plan:  individual psychotherapy    Return to clinic: as scheduled    Length of Service (minutes): 60

## 2024-09-09 DIAGNOSIS — Z13.31 POSITIVE DEPRESSION SCREENING: ICD-10-CM

## 2024-09-09 DIAGNOSIS — F41.9 ANXIETY: ICD-10-CM

## 2024-09-09 RX ORDER — CLONAZEPAM 0.5 MG/1
0.5 TABLET ORAL 2 TIMES DAILY PRN
Qty: 60 TABLET | Refills: 0 | Status: SHIPPED | OUTPATIENT
Start: 2024-09-09 | End: 2025-09-09

## 2024-09-09 NOTE — TELEPHONE ENCOUNTER
----- Message from Brenna Thorne sent at 9/9/2024  8:33 AM CDT -----  Pt  sabrina is calling for refill on klonopin, they only got 4 pills last time   [facility] [place]      [phone]

## 2024-09-24 ENCOUNTER — OFFICE VISIT (OUTPATIENT)
Dept: PSYCHIATRY | Facility: CLINIC | Age: 59
End: 2024-09-24
Payer: COMMERCIAL

## 2024-09-24 DIAGNOSIS — F32.A DEPRESSION, UNSPECIFIED DEPRESSION TYPE: ICD-10-CM

## 2024-09-24 DIAGNOSIS — F41.9 ANXIETY DISORDER, UNSPECIFIED TYPE: Primary | ICD-10-CM

## 2024-09-24 PROCEDURE — 99999 PR PBB SHADOW E&M-EST. PATIENT-LVL I: CPT | Mod: PBBFAC,,, | Performed by: SOCIAL WORKER

## 2024-09-24 NOTE — PROGRESS NOTES
"Individual Psychotherapy (PhD/LCSW)    9/24/2024    Site:  [facility]        Therapeutic Intervention: Met with patient.  Outpatient - Supportive psychotherapy 45 min - CPT Code 87437 and Outpatient - Interactive psychotherapy 45 min - CPT code 71945    Chief complaint/reason for encounter: depression and anxiety   Medical history:   Past Medical History:   Diagnosis Date    Personal history of colonic polyps 06/05/2023       Medications:    Current Outpatient Medications:     clonazePAM (KLONOPIN) 0.5 MG tablet, Take 1 tablet (0.5 mg total) by mouth 2 (two) times daily as needed for Anxiety., Disp: 60 tablet, Rfl: 0    sertraline (ZOLOFT) 25 MG tablet, Take 25 mg by mouth., Disp: , Rfl:     Family history of psychiatric illness:   Family History   Problem Relation Name Age of Onset    Diabetes Mother      Dementia Mother      Cancer Father      Diabetes Maternal Grandmother      Depression Maternal Grandmother         Social history (marriage, employment, etc.): Tammy presents on time for today's initial.  Her  Maynor accompanies her.  She would like Maynor present however I ask to meet with her alone.  At first she is upset and resistant, however  encourages her to see clinician.  I note that she is periodically shaking, grabbing her legs and arms, appears distressed, speaks to being nervous, feels better when she is able to stand and explain that standing is okay.  She is 59 year old  female,  to  since 1987.  She is unsure of his profession.  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  They have two daughters: Rosie who is 32 and Ewa who is 25.  Ewa resides with them and she works as a "little teacher".  They have one 5 year old grandson named Yoni (Rosie's son).   She was raised in [place] on [address].  Likes to cook Italian food and says her meatballs and " "spaghetti are very good.  She is one of two children:  Sridevi is her older sister.  She admits that Sridevi can be "bossy".  Both parents are .  Father was a  and predeceased her mother.  Believes mother suffered from depression.  Describes childhood as happy and denies hx of childhood abuse of any kind.  Denies any self-harming activities, attempted suicides, psych hospitalizations, street drugs or alcohol abuse and repeatedly states "I don't know" when ask why she is anxious, and what is her thinking, thoughts that are driving the anxiety.  Does admit that she fears "being alone" and wants someone to be with her.  Admits she is fearful of being in her home when  is away (he is away one week, returns home for one week, and then is off again).  Engaged Tammy in DDB and does well.  Gave her a Breath stone.  Briefly met with her  Maynor (60) and explain when I want to see her again and what her homework is.  He appears kind and amenable to helping.  Will put in referral to Dr. Swan for assessment and med mgmt.  Tammy agrees to engage in therapy and have scheduled her for a follow up prior to her exiting the clinic.  Will see her as scheduled.      Session on 2024:  Tammy presents today with  Vincent Washburn" for a mutual session. She has good eye contact, speech is direct, behavior is agitated, nervous, jerky movements throughout the session, stands on occasion as experiences difficulty in remaining sitting, mood is highly anxious, although stable. Denies suicidal or homicidal thoughts. No self harming activities noted.  Maynor is able to engage in this session to help clinician have a better understanding of Tammy's condition, when it started and events noted at the start. Mother and mother in law with whom she was very close both  within one year of each other. Problems with oldest daughter Roise where Tammy gave Rosie part of her inheritance from her mother's " "estate, roughly $50,000, to help Rosie keep her home (Rosie works part time and her firikye is unemployed) may have contributed to this. Worries surrounding youngest daughter Suma surrounding Suma's former fipatrice also surfaced although both note that Suma is now engaged to a nice  from [place] and doing very well. Maynor believes these events may have played a part in Tammy's extreme anxious mood. Tammy admits during the session that sometimes "I think thoughts that I should not think" however is not able to speak to what those thoughts are. Explain thoughts, feelings and resulting behaviors. Explain brain's functions relating to thoughts and emotions. Appears to be listening. Explain Challenge the Thought and first knowing what the thought is. Signs releases so I may secure records from her psychiatrist who is outside of the [facility] system. Also signs Involvement of Care form so I may communicate with her . Will see her as scheduled. Goals: take her meds (Zoloft will go from 50 to 100 mgs when she next sees Dr. Babatunde Abraham with [facility] in [place]; attend to therapy sessions with clinician are the two primary goals. Will see her as scheduled.      8/27/2024 session: Tammy presents with good eye contact, direct speech, thought processes intact, goal directed, oriented x3, behavior cooperative, less shaky movements, is smiling more, this time  Maynor stays in reception area, she is able to come into my clinic office by herself, she feels more comfortable as she accompanies me.  Engage her in the breath.  Give her a breath stone.  Review Thoughts, Feelings, Resulting Behaviors.  Speaks to not giving Rosie any more monies from her inheritance.  Question her whether she expects Rosie to repay her, she states she hopes she will however not clear.  If she does, urge her to accept same.  We speak to Ewa, and how well she is doing and she agrees.  " "Children are a major concern for Tammy.  We speak to the deaths of her mother and mother in law coming in such close proximity to each other and how jarring this much have been for her and she agrees.  Escort her back and she is calmer, will see her as scheduled.  She is making progress.       Social History     Tobacco Use    Smoking status: Never    Smokeless tobacco: Never   Substance Use Topics    Alcohol use: Yes     Comment: Occ    Drug use: Never       Interval history and content of current session: "Sometimes I think of stuff and I get nervous" press her on what thoughts, remind her of leading to feelings, then resulting actions, gets it.  Engages in negative thinking.  Switch to what she is grateful for.  Give her Gratitude Journal with caveat must write down each day until next session and not repeat herself.      Treatment plan:  Target symptoms: recurrent depression, anxiety   Why chosen therapy is appropriate versus another modality: relevant to diagnosis, patient responds to this modality, evidence based practice  Outcome monitoring methods: self-report, observation  Therapeutic intervention type: insight oriented psychotherapy, behavior modifying psychotherapy, supportive psychotherapy    Risk parameters:  Patient reports no suicidal ideation  Patient reports no homicidal ideation  Patient reports no self-injurious behavior  Patient reports no violent behavior    Verbal deficits: None    Patient's response to intervention:  The patient's response to intervention is accepting, motivated.    Progress toward goals and other mental status changes:  The patient's progress toward goals is good.    Diagnosis:   1. Anxiety disorder, unspecified type    2. Depression, unspecified depression type        Plan:  individual psychotherapy    Return to clinic: as scheduled    Length of Service (minutes): 60    "

## 2024-10-17 ENCOUNTER — OFFICE VISIT (OUTPATIENT)
Dept: FAMILY MEDICINE | Facility: CLINIC | Age: 59
End: 2024-10-17
Payer: COMMERCIAL

## 2024-10-17 VITALS
SYSTOLIC BLOOD PRESSURE: 116 MMHG | DIASTOLIC BLOOD PRESSURE: 78 MMHG | OXYGEN SATURATION: 97 % | WEIGHT: 177 LBS | BODY MASS INDEX: 32.57 KG/M2 | HEART RATE: 71 BPM | HEIGHT: 62 IN

## 2024-10-17 DIAGNOSIS — R41.3 MEMORY LOSS: ICD-10-CM

## 2024-10-17 DIAGNOSIS — Z13.31 POSITIVE DEPRESSION SCREENING: ICD-10-CM

## 2024-10-17 DIAGNOSIS — Z12.31 ENCOUNTER FOR SCREENING MAMMOGRAM FOR MALIGNANT NEOPLASM OF BREAST: Primary | ICD-10-CM

## 2024-10-17 DIAGNOSIS — F41.9 ANXIETY: ICD-10-CM

## 2024-10-17 PROCEDURE — 99213 OFFICE O/P EST LOW 20 MIN: CPT | Mod: S$GLB,,, | Performed by: FAMILY MEDICINE

## 2024-10-17 PROCEDURE — 99999 PR PBB SHADOW E&M-EST. PATIENT-LVL III: CPT | Mod: PBBFAC,,, | Performed by: FAMILY MEDICINE

## 2024-10-17 PROCEDURE — 3078F DIAST BP <80 MM HG: CPT | Mod: CPTII,S$GLB,, | Performed by: FAMILY MEDICINE

## 2024-10-17 PROCEDURE — 3074F SYST BP LT 130 MM HG: CPT | Mod: CPTII,S$GLB,, | Performed by: FAMILY MEDICINE

## 2024-10-17 PROCEDURE — 3008F BODY MASS INDEX DOCD: CPT | Mod: CPTII,S$GLB,, | Performed by: FAMILY MEDICINE

## 2024-10-17 RX ORDER — CLONAZEPAM 0.5 MG/1
0.5 TABLET ORAL 2 TIMES DAILY PRN
Qty: 60 TABLET | Refills: 0 | Status: SHIPPED | OUTPATIENT
Start: 2024-10-17 | End: 2025-10-17

## 2024-10-17 RX ORDER — SERTRALINE HYDROCHLORIDE 100 MG/1
100 TABLET, FILM COATED ORAL DAILY
COMMUNITY
Start: 2024-08-08

## 2024-10-17 NOTE — PROGRESS NOTES
SUBJECTIVE:    Patient ID: Tammy Yao is a 59 y.o. female.    Chief Complaint: Anxiety  57 yo female here today to follow up on her anxiety, pt at last visit had follow up with psychiatry and counseling, she has a follow up appointment with a new psychiatris. She is currently on zoloft 100mg, and clonazapam, she appears more comfortable today, MMSE today 26/30    I reviewed her overdue health maintenance patient is due for mammogram, influenza vaccine, COVID-19 vaccine, tetanus vaccine, hemoglobin A1c screening, shingles vaccine.    Significant Past medical history  Depression:  Clonazepam 0.5 mg b.i.d. (Zoloft discontinued)  Insomnia:        Specialists  Gyn : Dr JERRY Paredes  GI: Dr Heredia       Smoke:None  ETOH: None  Exercise: None      Anxiety  Presents for follow-up visit. Symptoms include confusion, nervous/anxious behavior and panic. Patient reports no chest pain, dysphagia or palpitations. Symptoms occur occasionally. The severity of symptoms is moderate. The quality of sleep is non-restorative.     Compliance with medications is %.         Past Medical History:   Diagnosis Date    Personal history of colonic polyps 06/05/2023     Social History     Socioeconomic History    Marital status:    Tobacco Use    Smoking status: Never    Smokeless tobacco: Never   Substance and Sexual Activity    Alcohol use: Yes     Comment: Occ    Drug use: Never    Sexual activity: Yes     Partners: Male     Social Drivers of Health     Financial Resource Strain: Low Risk  (5/30/2024)    Overall Financial Resource Strain (CARDIA)     Difficulty of Paying Living Expenses: Not very hard   Food Insecurity: No Food Insecurity (5/30/2024)    Hunger Vital Sign     Worried About Running Out of Food in the Last Year: Never true     Ran Out of Food in the Last Year: Never true   Transportation Needs: No Transportation Needs (5/12/2024)    PRAPARE - Transportation     Lack of Transportation (Medical): No     Lack of  Transportation (Non-Medical): No   Physical Activity: Insufficiently Active (5/30/2024)    Exercise Vital Sign     Days of Exercise per Week: 1 day     Minutes of Exercise per Session: 20 min   Stress: Stress Concern Present (5/30/2024)    [place] [employer] - Occupational Stress Questionnaire     Feeling of Stress : Very much   Housing Stability: Unknown (5/30/2024)    Housing Stability Vital Sign     Unable to Pay for Housing in the Last Year: No     History reviewed. No pertinent surgical history.  Family History   Problem Relation Name Age of Onset    Diabetes Mother      Dementia Mother      Cancer Father      Diabetes Maternal Grandmother      Depression Maternal Grandmother       Current Outpatient Medications   Medication Sig Dispense Refill    sertraline (ZOLOFT) 100 MG tablet Take 100 mg by mouth once daily.      clonazePAM (KLONOPIN) 0.5 MG tablet Take 1 tablet (0.5 mg total) by mouth 2 (two) times daily as needed for Anxiety. 60 tablet 0     No current facility-administered medications for this visit.     Review of patient's allergies indicates:  No Known Allergies    Review of Systems   Constitutional:  Negative for activity change and unexpected weight change.   HENT:  Negative for hearing loss, rhinorrhea and trouble swallowing.    Eyes:  Negative for discharge and visual disturbance.   Respiratory:  Negative for chest tightness and wheezing.    Cardiovascular:  Negative for chest pain and palpitations.   Gastrointestinal:  Negative for blood in stool, constipation, diarrhea and vomiting.   Endocrine: Negative for polydipsia and polyuria.   Genitourinary:  Negative for difficulty urinating, dysuria, hematuria and menstrual problem.   Musculoskeletal:  Negative for arthralgias, joint swelling and neck pain.   Neurological:  Negative for weakness and headaches.   Psychiatric/Behavioral:  Positive for confusion. Negative for dysphoric mood. The patient is nervous/anxious.        "    Blood pressure 116/78, pulse 71, height 5' 2" (1.575 m), weight 80.3 kg (177 lb), SpO2 97%. Body mass index is 32.37 kg/m².   Objective:      Physical Exam  Vitals reviewed.   Constitutional:       General: She is not in acute distress.     Appearance: Normal appearance. She is obese. She is not toxic-appearing.   HENT:      Head: Normocephalic and atraumatic.      Right Ear: Tympanic membrane normal.      Left Ear: Tympanic membrane normal.   Cardiovascular:      Rate and Rhythm: Normal rate and regular rhythm.      Heart sounds: Murmur heard.   Pulmonary:      Effort: No respiratory distress.      Breath sounds: Normal breath sounds. No wheezing or rhonchi.   Skin:     General: Skin is warm and dry.      Capillary Refill: Capillary refill takes less than 2 seconds.   Neurological:      Mental Status: She is alert and oriented to person, place, and time.      Comments: MMSE 26/30             Assessment:       1. Encounter for screening mammogram for malignant neoplasm of breast    2. Anxiety    3. Positive depression screening    4. Memory loss         Plan:           Encounter for screening mammogram for malignant neoplasm of breast  -     Mammo Digital Screening Bilat w/ [facility]; Future; Expected date: 10/17/2024    Anxiety  -     clonazePAM (KLONOPIN) 0.5 MG tablet; Take 1 tablet (0.5 mg total) by mouth 2 (two) times daily as needed for Anxiety.  Dispense: 60 tablet; Refill: 0  Will continue on her current dose of Zoloft and Klonopin she has follow-up with Psychiatry in 2 weeks.  She will continue with therapy.  Positive depression screening  Comments:  I have reviewed the positive depression score which warrants active treatment with psychotherapy and/or medications.  Orders:  -     clonazePAM (KLONOPIN) 0.5 MG tablet; Take 1 tablet (0.5 mg total) by mouth 2 (two) times daily as needed for Anxiety.  Dispense: 60 tablet; Refill: 0    Memory loss  -     Ambulatory referral/consult to Neurology; Future; Expected " date: 10/24/2024  Patient scored 24/30 last month on the mini-mental status exam today she scored 26/30 she seemed more attentive low suspicion for dementia,  is very concerned will place referral to Neurology for evaluation.

## 2024-10-23 ENCOUNTER — TELEPHONE (OUTPATIENT)
Dept: NEUROLOGY | Facility: CLINIC | Age: 59
End: 2024-10-23
Payer: COMMERCIAL

## 2024-10-23 NOTE — TELEPHONE ENCOUNTER
Called patient to schedule appointment from referral for memory. No answer. Left message to return call.

## 2024-10-25 ENCOUNTER — TELEPHONE (OUTPATIENT)
Dept: NEUROLOGY | Facility: CLINIC | Age: 59
End: 2024-10-25
Payer: COMMERCIAL

## 2024-10-25 NOTE — TELEPHONE ENCOUNTER
Called patient to schedule appointment from referral for memory loss. No answer. Left message to return call.

## 2024-10-30 ENCOUNTER — TELEPHONE (OUTPATIENT)
Dept: NEUROLOGY | Facility: CLINIC | Age: 59
End: 2024-10-30
Payer: COMMERCIAL

## 2024-10-31 ENCOUNTER — OFFICE VISIT (OUTPATIENT)
Dept: PSYCHIATRY | Facility: CLINIC | Age: 59
End: 2024-10-31
Payer: COMMERCIAL

## 2024-10-31 VITALS
WEIGHT: 175.94 LBS | HEART RATE: 87 BPM | HEIGHT: 62 IN | BODY MASS INDEX: 32.37 KG/M2 | DIASTOLIC BLOOD PRESSURE: 70 MMHG | SYSTOLIC BLOOD PRESSURE: 105 MMHG

## 2024-10-31 DIAGNOSIS — F41.0 GENERALIZED ANXIETY DISORDER WITH PANIC ATTACKS: ICD-10-CM

## 2024-10-31 DIAGNOSIS — E78.5 HYPERLIPIDEMIA, UNSPECIFIED HYPERLIPIDEMIA TYPE: ICD-10-CM

## 2024-10-31 DIAGNOSIS — F43.20 ADJUSTMENT DISORDER, UNSPECIFIED TYPE: Primary | ICD-10-CM

## 2024-10-31 DIAGNOSIS — R45.89 LONELINESS: ICD-10-CM

## 2024-10-31 DIAGNOSIS — F33.41 RECURRENT MAJOR DEPRESSIVE DISORDER, IN PARTIAL REMISSION: ICD-10-CM

## 2024-10-31 DIAGNOSIS — F41.1 GENERALIZED ANXIETY DISORDER WITH PANIC ATTACKS: ICD-10-CM

## 2024-10-31 PROBLEM — F32.0 MAJOR DEPRESSIVE DISORDER, SINGLE EPISODE, MILD: Status: RESOLVED | Noted: 2024-05-13 | Resolved: 2024-10-31

## 2024-10-31 PROCEDURE — 90792 PSYCH DIAG EVAL W/MED SRVCS: CPT | Mod: S$GLB,,, | Performed by: STUDENT IN AN ORGANIZED HEALTH CARE EDUCATION/TRAINING PROGRAM

## 2024-10-31 PROCEDURE — 99999 PR PBB SHADOW E&M-EST. PATIENT-LVL IV: CPT | Mod: PBBFAC,,, | Performed by: STUDENT IN AN ORGANIZED HEALTH CARE EDUCATION/TRAINING PROGRAM

## 2024-10-31 PROCEDURE — 1159F MED LIST DOCD IN RCRD: CPT | Mod: CPTII,S$GLB,, | Performed by: STUDENT IN AN ORGANIZED HEALTH CARE EDUCATION/TRAINING PROGRAM

## 2024-10-31 PROCEDURE — 1160F RVW MEDS BY RX/DR IN RCRD: CPT | Mod: CPTII,S$GLB,, | Performed by: STUDENT IN AN ORGANIZED HEALTH CARE EDUCATION/TRAINING PROGRAM

## 2024-10-31 PROCEDURE — 3074F SYST BP LT 130 MM HG: CPT | Mod: CPTII,S$GLB,, | Performed by: STUDENT IN AN ORGANIZED HEALTH CARE EDUCATION/TRAINING PROGRAM

## 2024-10-31 PROCEDURE — 3078F DIAST BP <80 MM HG: CPT | Mod: CPTII,S$GLB,, | Performed by: STUDENT IN AN ORGANIZED HEALTH CARE EDUCATION/TRAINING PROGRAM

## 2024-10-31 RX ORDER — CLONAZEPAM 0.5 MG/1
0.5 TABLET ORAL DAILY PRN
Qty: 30 TABLET | Refills: 1 | Status: SHIPPED | OUTPATIENT
Start: 2024-10-31

## 2024-10-31 RX ORDER — SERTRALINE HYDROCHLORIDE 100 MG/1
100 TABLET, FILM COATED ORAL NIGHTLY
Qty: 90 TABLET | Refills: 1 | Status: SHIPPED | OUTPATIENT
Start: 2024-10-31

## 2024-11-06 ENCOUNTER — HOSPITAL ENCOUNTER (OUTPATIENT)
Dept: RADIOLOGY | Facility: HOSPITAL | Age: 59
Discharge: HOME | End: 2024-11-06
Attending: FAMILY MEDICINE
Payer: COMMERCIAL

## 2024-11-06 VITALS — WEIGHT: 175 LBS | HEIGHT: 62 IN | BODY MASS INDEX: 32.2 KG/M2

## 2024-11-06 DIAGNOSIS — Z12.31 ENCOUNTER FOR SCREENING MAMMOGRAM FOR MALIGNANT NEOPLASM OF BREAST: ICD-10-CM

## 2024-11-06 PROCEDURE — 77063 BREAST TOMOSYNTHESIS BI: CPT | Mod: 26,,, | Performed by: RADIOLOGY

## 2024-11-06 PROCEDURE — 77063 BREAST TOMOSYNTHESIS BI: CPT | Mod: TC,PO

## 2024-11-06 PROCEDURE — 77067 SCR MAMMO BI INCL CAD: CPT | Mod: 26,,, | Performed by: RADIOLOGY

## 2024-11-19 ENCOUNTER — TELEPHONE (OUTPATIENT)
Dept: NEUROLOGY | Facility: CLINIC | Age: 59
End: 2024-11-19
Payer: COMMERCIAL

## 2024-11-19 NOTE — TELEPHONE ENCOUNTER
----- Message from Estella sent at 11/19/2024 11:15 AM CST -----  Regarding: sooner apt  Contact: patient  Type:  Sooner Appointment Request    Caller is requesting a sooner appointment.  Caller declined first available appointment listed below.  Caller will not accept being placed on the waitlist and is requesting a message be sent to doctor.    Name of Caller:  patient   When is the first available appointment?    Symptoms:  memory loss see referral  Would the patient rather a call back or a response via [facility]?   Best Call Back Number:  [phone]    Additional Information:  call to be seen thanks

## 2024-11-19 NOTE — TELEPHONE ENCOUNTER
Spoke with patients daughter and scheduled appointment for memory loss for 12/2/24 with Dr. Pino.

## 2024-11-27 ENCOUNTER — TELEPHONE (OUTPATIENT)
Dept: NEUROLOGY | Facility: CLINIC | Age: 59
End: 2024-11-27
Payer: COMMERCIAL

## 2024-11-27 NOTE — TELEPHONE ENCOUNTER
Called patient to inform Dr. Pino will not be in clinic on 12/2/24 and will need to cancel appointment. No answer. Left message to return call.

## 2024-11-29 ENCOUNTER — TELEPHONE (OUTPATIENT)
Dept: NEUROLOGY | Facility: CLINIC | Age: 59
End: 2024-11-29
Payer: COMMERCIAL

## 2024-11-29 NOTE — TELEPHONE ENCOUNTER
Called both patients daughter and patients  to inform need to cancel 12/2/24 appointment with Dr. Pino. No answer at either number. Left message for both. Appointment cancelled.

## 2024-11-29 NOTE — TELEPHONE ENCOUNTER
Spoke with patients daughter and explained Dr. Pino will not be seeing patients in clinic. Will call back to reschedule once we have openings. Patients daughter voiced understanding.

## 2024-11-29 NOTE — TELEPHONE ENCOUNTER
----- Message from Zena sent at 11/29/2024 12:15 PM CST -----  Regarding: Patient Returning Call  Contact:  at [phone]  Type:  Patient Returning Call    Who Called:   at [phone]    Who Left Message for Patient:  Shona pabon  Does the patient know what this is regarding?:  yes, reschedule for Dr. Pino  Additional Information:  Please call and advise. Thank you

## 2024-12-12 ENCOUNTER — TELEPHONE (OUTPATIENT)
Dept: PSYCHIATRY | Facility: CLINIC | Age: 59
End: 2024-12-12
Payer: COMMERCIAL

## 2024-12-13 NOTE — TELEPHONE ENCOUNTER
Spoke to patient daughter to reschedule her appt with Dr. Swan. She requested appt on Jose 10 or 13. She agreed to appt on 1/13 @ 2pm. We also scheduled another appt for Tammy on 12/31 @ 11am. No further questions or concerns at this time.    PAST SURGICAL HISTORY:  No significant past surgical history

## 2024-12-31 ENCOUNTER — OFFICE VISIT (OUTPATIENT)
Dept: PSYCHIATRY | Facility: CLINIC | Age: 59
End: 2024-12-31
Payer: COMMERCIAL

## 2024-12-31 DIAGNOSIS — F41.0 GENERALIZED ANXIETY DISORDER WITH PANIC ATTACKS: ICD-10-CM

## 2024-12-31 DIAGNOSIS — F41.1 GENERALIZED ANXIETY DISORDER WITH PANIC ATTACKS: ICD-10-CM

## 2024-12-31 DIAGNOSIS — F33.41 RECURRENT MAJOR DEPRESSIVE DISORDER, IN PARTIAL REMISSION: Primary | ICD-10-CM

## 2024-12-31 PROCEDURE — 1159F MED LIST DOCD IN RCRD: CPT | Mod: CPTII,S$GLB,, | Performed by: SOCIAL WORKER

## 2024-12-31 PROCEDURE — 90834 PSYTX W PT 45 MINUTES: CPT | Mod: S$GLB,,, | Performed by: SOCIAL WORKER

## 2024-12-31 NOTE — PROGRESS NOTES
Individual Psychotherapy (PhD/LCSW)    12/31/2024    Site:  [facility]        Therapeutic Intervention: Met with patient.  Outpatient - Supportive psychotherapy 45 min - CPT Code 26584 and Outpatient - Interactive psychotherapy 45 min - CPT code 70863    Chief complaint/reason for encounter: depression and anxiety   Medical history:   Past Medical History:   Diagnosis Date    Personal history of colonic polyps 06/05/2023       Medications:    Current Outpatient Medications:     clonazePAM (KLONOPIN) 0.5 MG tablet, Take 1 tablet (0.5 mg total) by mouth daily as needed for Anxiety., Disp: 30 tablet, Rfl: 1    sertraline (ZOLOFT) 100 MG tablet, Take 1 tablet (100 mg total) by mouth every evening., Disp: 90 tablet, Rfl: 1    Family history of psychiatric illness:   Family History   Problem Relation Name Age of Onset    Diabetes Mother      Dementia Mother      Cancer Father      Diabetes Maternal Grandmother      Depression Maternal Grandmother         Social history (marriage, employment, etc.):   From Dr. Swan's notes on 10/31/2024:  Diagnosed generalized anxiety disorder with recent exacerbation  Identified adjustment disorder related to daughter's engagement and impending departure from home  Determined social isolation as a key factor contributing to current symptoms  Continued ZOLOFT 100mg without adjustments  Continued KLONOPIN as needed for anxiety.    Initial:  Tammy presents on time for today's initial.  Her  Maynor accompanies her.  She would like Maynor present however I ask to meet with her alone.  At first she is upset and resistant, however  encourages her to see clinician.  I note that she is periodically shaking, grabbing her legs and arms, appears distressed, speaks to being nervous, feels better when she is able to stand and explain that standing is okay.  She is 59 year old  female,  to  since 1985.  She is unsure of his profession.  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  They have two daughters: Rsoie who is 32 and Ewa who is 25.  Ewa resides with them and she works as a "little teacher".  They have one 5 year old grandson named Yoni (Rosie's son).   She was raised in [place] on [address] [address] [facility].  Likes to cook Italian food and says her meatballs and spaghetti are very good.  She is one of two children:  Sridevi is her older sister.  She admits that Sridevi can be "bossy".  Both parents are .  Father was a  and predeceased her mother.  Believes mother suffered from depression.  Describes childhood as happy and denies hx of childhood abuse of any kind.  Denies any self-harming activities, attempted suicides, psych hospitalizations, street drugs or alcohol abuse and repeatedly states "I don't know" when ask why she is anxious, and what is her thinking, thoughts that are driving the anxiety.  Does admit that she fears "being alone" and wants someone to be with her.  Admits she is fearful of being in her home when  is away (he is away one week, returns home for one week, and then is off again).  Engaged Tammy in [employer] and does well.  Gave her a Breath stone.  Briefly met with her  Maynor (62) and explain when I want to see her again and what her homework is.  He appears kind and amenable to helping.  Will put in referral to Dr. Swan for assessment and med mgmt.  Tammy agrees to engage in therapy and have scheduled her for a follow up prior to her exiting the clinic.  Will see her as scheduled.      Session on 2024:  Tammy presents today with  Vincent Washburn" for a mutual session. She has good eye contact, speech is direct, behavior is agitated, nervous, jerky movements throughout the session, stands on occasion as experiences difficulty in remaining sitting, mood is highly anxious, although stable. Denies suicidal or homicidal " "thoughts. No self harming activities noted.  Maynor is able to engage in this session to help clinician have a better understanding of Tammy's condition, when it started and events noted at the start. Mother and mother in law with whom she was very close both  within one year of each other. Problems with oldest daughter Rosie where Tammy gave Rosie part of her inheritance from her mother's estate, roughly $50,000, to help Rosie keep her home (Rosie works part time and her fiancee is unemployed) may have contributed to this. Worries surrounding youngest daughter Suma surrounding Suma's former fipatrice also surfaced although both note that Suma is now engaged to a nice  from [place] and doing very well. Maynor believes these events may have played a part in Tammy's extreme anxious mood. Tammy admits during the session that sometimes "I think thoughts that I should not think" however is not able to speak to what those thoughts are. Explain thoughts, feelings and resulting behaviors. Explain brain's functions relating to thoughts and emotions. Appears to be listening. Explain Challenge the Thought and first knowing what the thought is. Signs releases so I may secure records from her psychiatrist who is outside of the [facility]. Also signs Involvement of Care form so I may communicate with her . Will see her as scheduled. Goals: take her meds (Zoloft will go from 50 to 100 mgs when she next sees Dr. Babatunde Abraham with [facility] in [place]; attend to therapy sessions with clinician are the two primary goals. Will see her as scheduled.      2024 session: Tammy presents with good eye contact, direct speech, thought processes intact, goal directed, oriented x3, behavior cooperative, less shaky movements, is smiling more, this time  Maynor stays in reception area, she is able to come into my clinic office by herself, she feels more " "comfortable as she accompanies me.  Engage her in the breath.  Give her a breath stone.  Review Thoughts, Feelings, Resulting Behaviors.  Speaks to not giving Rosie any more monies from her inheritance.  Question her whether she expects Rosie to repay her, she states she hopes she will however not clear.  If she does, urge her to accept same.  We speak to Ewa, and how well she is doing and she agrees.  Children are a major concern for Tammy.  We speak to the deaths of her mother and mother in law coming in such close proximity to each other and how jarring this much have been for her and she agrees.  Escort her back and she is calmer, will see her as scheduled.  She is making progress.      Session on 9/24/2024:   "Sometimes I think of stuff and I get nervous" press her on what thoughts, remind her of leading to feelings, then resulting actions, gets it.  Engages in negative thinking.  Switch to what she is grateful for.  Give her Gratitude Journal with caveat must write down each day until next session and not repeat herself.       Social History     Tobacco Use    Smoking status: Never    Smokeless tobacco: Never   Substance Use Topics    Alcohol use: Yes     Comment: Occ    Drug use: Never       Interval history and content of current session: Tammy presents on time for follow up and I note that she is smiling, has make up, is much more talkative, no shaking, admits to feeling and coping better.  Feels she is on the right track.     Treatment plan:  Target symptoms: recurrent depression, anxiety   Why chosen therapy is appropriate versus another modality: relevant to diagnosis, patient responds to this modality, evidence based practice  Outcome monitoring methods: self-report, observation  Therapeutic intervention type: insight oriented psychotherapy, behavior modifying psychotherapy, supportive psychotherapy    Risk parameters:  Patient reports no suicidal ideation  Patient reports no homicidal " ideation  Patient reports no self-injurious behavior  Patient reports no violent behavior    Verbal deficits: None    Patient's response to intervention:  The patient's response to intervention is accepting, motivated.    Progress toward goals and other mental status changes:  The patient's progress toward goals is good.    Diagnosis:   1. Recurrent major depressive disorder, in partial remission    2. Generalized anxiety disorder with panic attacks        Plan:  individual psychotherapy    Return to clinic: as scheduled    Length of Service (minutes): 45

## 2025-01-09 ENCOUNTER — TELEPHONE (OUTPATIENT)
Dept: NEUROLOGY | Facility: CLINIC | Age: 60
End: 2025-01-09

## 2025-01-09 ENCOUNTER — LAB VISIT (OUTPATIENT)
Dept: LAB | Facility: HOSPITAL | Age: 60
End: 2025-01-09
Attending: NURSE PRACTITIONER
Payer: COMMERCIAL

## 2025-01-09 ENCOUNTER — OFFICE VISIT (OUTPATIENT)
Dept: NEUROLOGY | Facility: CLINIC | Age: 60
End: 2025-01-09
Payer: COMMERCIAL

## 2025-01-09 ENCOUNTER — TELEPHONE (OUTPATIENT)
Dept: PSYCHIATRY | Facility: CLINIC | Age: 60
End: 2025-01-09
Payer: COMMERCIAL

## 2025-01-09 VITALS
HEIGHT: 62 IN | SYSTOLIC BLOOD PRESSURE: 112 MMHG | HEART RATE: 109 BPM | BODY MASS INDEX: 32.57 KG/M2 | RESPIRATION RATE: 20 BRPM | WEIGHT: 177 LBS | DIASTOLIC BLOOD PRESSURE: 74 MMHG

## 2025-01-09 DIAGNOSIS — F41.1 GENERALIZED ANXIETY DISORDER WITH PANIC ATTACKS: ICD-10-CM

## 2025-01-09 DIAGNOSIS — F33.41 RECURRENT MAJOR DEPRESSIVE DISORDER, IN PARTIAL REMISSION: ICD-10-CM

## 2025-01-09 DIAGNOSIS — R41.3 OTHER AMNESIA: ICD-10-CM

## 2025-01-09 DIAGNOSIS — R41.3 MEMORY LOSS: Primary | ICD-10-CM

## 2025-01-09 DIAGNOSIS — R41.3 MEMORY LOSS: ICD-10-CM

## 2025-01-09 DIAGNOSIS — F41.0 GENERALIZED ANXIETY DISORDER WITH PANIC ATTACKS: ICD-10-CM

## 2025-01-09 LAB
ALBUMIN SERPL BCP-MCNC: 3.9 G/DL (ref 3.5–5.2)
ALP SERPL-CCNC: 97 U/L (ref 40–150)
ALT SERPL W/O P-5'-P-CCNC: 16 U/L (ref 10–44)
ANION GAP SERPL CALC-SCNC: 10 MMOL/L (ref 8–16)
AST SERPL-CCNC: 17 U/L (ref 10–40)
BASOPHILS # BLD AUTO: 0.03 K/UL (ref 0–0.2)
BASOPHILS NFR BLD: 0.3 % (ref 0–1.9)
BILIRUB SERPL-MCNC: 0.3 MG/DL (ref 0.1–1)
BUN SERPL-MCNC: 11 MG/DL (ref 8–23)
CALCIUM SERPL-MCNC: 9.7 MG/DL (ref 8.7–10.5)
CHLORIDE SERPL-SCNC: 108 MMOL/L (ref 95–110)
CO2 SERPL-SCNC: 21 MMOL/L (ref 23–29)
CREAT SERPL-MCNC: 0.8 MG/DL (ref 0.5–1.4)
DIFFERENTIAL METHOD BLD: ABNORMAL
EOSINOPHIL # BLD AUTO: 0.2 K/UL (ref 0–0.5)
EOSINOPHIL NFR BLD: 2.1 % (ref 0–8)
ERYTHROCYTE [DISTWIDTH] IN BLOOD BY AUTOMATED COUNT: 13.7 % (ref 11.5–14.5)
EST. GFR  (NO RACE VARIABLE): >60 ML/MIN/1.73 M^2
FOLATE SERPL-MCNC: 6.1 NG/ML (ref 4–24)
GLUCOSE SERPL-MCNC: 75 MG/DL (ref 70–110)
HCT VFR BLD AUTO: 44.5 % (ref 37–48.5)
HGB BLD-MCNC: 13.6 G/DL (ref 12–16)
HIV 1+2 AB+HIV1 P24 AG SERPL QL IA: NORMAL
IMM GRANULOCYTES # BLD AUTO: 0.02 K/UL (ref 0–0.04)
IMM GRANULOCYTES NFR BLD AUTO: 0.2 % (ref 0–0.5)
LYMPHOCYTES # BLD AUTO: 2.2 K/UL (ref 1–4.8)
LYMPHOCYTES NFR BLD: 24.7 % (ref 18–48)
MCH RBC QN AUTO: 29.2 PG (ref 27–31)
MCHC RBC AUTO-ENTMCNC: 30.6 G/DL (ref 32–36)
MCV RBC AUTO: 96 FL (ref 82–98)
MONOCYTES # BLD AUTO: 0.7 K/UL (ref 0.3–1)
MONOCYTES NFR BLD: 8 % (ref 4–15)
NEUTROPHILS # BLD AUTO: 5.6 K/UL (ref 1.8–7.7)
NEUTROPHILS NFR BLD: 64.7 % (ref 38–73)
NRBC BLD-RTO: 0 /100 WBC
PLATELET # BLD AUTO: 220 K/UL (ref 150–450)
PMV BLD AUTO: 10.4 FL (ref 9.2–12.9)
POTASSIUM SERPL-SCNC: 4 MMOL/L (ref 3.5–5.1)
PROT SERPL-MCNC: 8 G/DL (ref 6–8.4)
RBC # BLD AUTO: 4.65 M/UL (ref 4–5.4)
SODIUM SERPL-SCNC: 139 MMOL/L (ref 136–145)
TSH SERPL DL<=0.005 MIU/L-ACNC: 2.25 UIU/ML (ref 0.4–4)
VIT B12 SERPL-MCNC: 382 PG/ML (ref 210–950)
WBC # BLD AUTO: 8.73 K/UL (ref 3.9–12.7)

## 2025-01-09 PROCEDURE — 83520 IMMUNOASSAY QUANT NOS NONAB: CPT | Performed by: NURSE PRACTITIONER

## 2025-01-09 PROCEDURE — 3074F SYST BP LT 130 MM HG: CPT | Mod: CPTII,S$GLB,, | Performed by: NURSE PRACTITIONER

## 2025-01-09 PROCEDURE — 0361U NEURFLMNT LT CHN DIG IA QUAN: CPT | Performed by: NURSE PRACTITIONER

## 2025-01-09 PROCEDURE — 3008F BODY MASS INDEX DOCD: CPT | Mod: CPTII,S$GLB,, | Performed by: NURSE PRACTITIONER

## 2025-01-09 PROCEDURE — 82746 ASSAY OF FOLIC ACID SERUM: CPT | Performed by: NURSE PRACTITIONER

## 2025-01-09 PROCEDURE — 1159F MED LIST DOCD IN RCRD: CPT | Mod: CPTII,S$GLB,, | Performed by: NURSE PRACTITIONER

## 2025-01-09 PROCEDURE — 84425 ASSAY OF VITAMIN B-1: CPT | Performed by: NURSE PRACTITIONER

## 2025-01-09 PROCEDURE — 99999 PR PBB SHADOW E&M-EST. PATIENT-LVL IV: CPT | Mod: PBBFAC,,, | Performed by: NURSE PRACTITIONER

## 2025-01-09 PROCEDURE — 83921 ORGANIC ACID SINGLE QUANT: CPT | Performed by: NURSE PRACTITIONER

## 2025-01-09 PROCEDURE — 85025 COMPLETE CBC W/AUTO DIFF WBC: CPT | Performed by: NURSE PRACTITIONER

## 2025-01-09 PROCEDURE — 80053 COMPREHEN METABOLIC PANEL: CPT | Performed by: NURSE PRACTITIONER

## 2025-01-09 PROCEDURE — 99205 OFFICE O/P NEW HI 60 MIN: CPT | Mod: S$GLB,,, | Performed by: NURSE PRACTITIONER

## 2025-01-09 PROCEDURE — 87389 HIV-1 AG W/HIV-1&-2 AB AG IA: CPT | Performed by: NURSE PRACTITIONER

## 2025-01-09 PROCEDURE — 3078F DIAST BP <80 MM HG: CPT | Mod: CPTII,S$GLB,, | Performed by: NURSE PRACTITIONER

## 2025-01-09 PROCEDURE — 84443 ASSAY THYROID STIM HORMONE: CPT | Performed by: NURSE PRACTITIONER

## 2025-01-09 PROCEDURE — 86592 SYPHILIS TEST NON-TREP QUAL: CPT | Performed by: NURSE PRACTITIONER

## 2025-01-09 PROCEDURE — 82607 VITAMIN B-12: CPT | Performed by: NURSE PRACTITIONER

## 2025-01-09 RX ORDER — DIAZEPAM 10 MG/1
10 TABLET ORAL
Qty: 1 TABLET | Refills: 0 | Status: SHIPPED | OUTPATIENT
Start: 2025-01-09 | End: 2025-02-08

## 2025-01-09 NOTE — ASSESSMENT & PLAN NOTE
Discussed normal aging / MCI / dementia with pt and family  Unclear etiology of symptoms discussed today  Consider primary psychological disorder given correlation to symptoms, but she has not shown improvement despite appropriate treatment (Rx, psychotherapy, family support)   Discussed vascular RF and importance of continued efforts to maximize vascular health  Medication list reviewed and discussed -- PRN benzo as noted     Plan:  MRI brain w wo to r/o structural cause, assess vascular burden / parenchyma   Serologies to assess for any metabolic factors that may worsen cognition  NP testing  Short interval f/u for result review, further testing if indicated

## 2025-01-09 NOTE — PATIENT INSTRUCTIONS
We will obtain some basic labs to rule out any reversible causes for your memory loss, such as a nutritional deficiency or a thyroid problem. We will also obtain some baseline neuroimaging to look at the brain structure itself. I have sent a Rx for a Valium for you to take prior to the MRI to help with claustrophobia. Do not take the Klonopin with it.     I have also referred you for a more in-depth cognitive assessment with our neuropyschologist. Please be aware that this process may take a few months to complete. They will call you when it is time to schedule.     Follow up with me in about 8 weeks to review results       While memory loss may not be reversible in many cases, we do know that there are several steps that you can take to prevent further memory loss:  Control of chronic vascular risk factors:  For longevity and cognitive health, you should work closely with your PCP and other specialists to aggressively control vascular risk factors, including hypertension, hyperlipidemia, obesity and diabetes.   Cigarette smoking and other tobacco use greatly increase long term vascular risk (risk for heart attacks, strokes, or other vascular diseases).       Diet:  We recommend the MIND diet, a combination of two healthy diets: the Mediterranean diet and the DASH (Dietary Approaches to Stop Hypertension) diet. It includes a variety of brain-friendly foods to optimize cognitive health and longevity. This diet typically includes fresh fruits/vegetables, whole grains, fish and poultry.   Research indicates certain nutrients may aid in brain function, such as B vitamins (especially B6, B12, and folic acid), antioxidants (such as vitamins C and E, and beta carotene), and Omega-3 fatty acids.  Minimize or eliminate the use of alcohol     Medications:  Discuss any prescription or over the counter medications you might be taking with your doctor to avoid those that can cause sedation or worsen cognitive function, such as  sleep aids, benzodiazepines, and antihistamines.     Socialization and Exercise:  30-45 minutes of brisk physical activity 5 days/week has been shown to improve function in vascular dementias, lower the risk of stroke and slow the progression of memory loss  Activities that are engaging or mentally stimulating, such as word puzzles, jigsaw puzzles and Sudoku, are also beneficial to cognitive health  Regular interaction with friends, family and community are also known to be helpful.     Sleep:  Establish a regular, consistent sleep pattern and practice good sleep hygiene.    Avoid screen time (computer, TV, smartphones or tablets) or heavy meals for at least an hour before bedtime.   Avoid caffeine or stimulants after 2 PM.   Exercise earlier in the day or mornings and keep your sleeping environment comfortable. Bedtime and wake-up times should be consistent every night and morning so the body becomes used to a single routine, even on the weekends.   Having a wind down routine (e.g., soft lights in the house, bath before bed, reduced fluid intake, songs, reading, less noise) also helps to promote sleep readiness.   Untreated obstructive sleep apnea can lead to an increased risk for stroke and further memory loss      STRATEGIES TO COPE WITH YOUR COGNITIVE DEFICITS:  Pay Attention!  Reduce distractions in the area  Look at the person speaking to you & paraphrase what they are saying  Write down important things  Ask them to repeat themselves if you zone out  Ask people to simplify or reduce information if you need to  Processing Speed  Using multiple methods to learn new information, such as listening, taking notes, and recording, can be helpful  Give yourself enough time to complete certain tasks to reduce frustration  Executive Functioning  Don't try to multi-task. Do tasks separately to ensure that each gets completed.   Use a calendar or planner to keep you on track  Write down steps to complicated tasks in  case you forget  Storing Information  Immediately after you learn something, try to recall it. Repeat this and gradually lengthen the interval between your recalls  Recalling information   Jog your memory! If you loose something, try to think back to when you last had it. Mentally walk yourself through the steps of your day to prod your memory.   Use clues, timers, memos, notes, etc.  Stay organized - keep your keys in a certain place, put your important papers in a certain place, etc.   Having a routine helps to anchor memories as well

## 2025-01-09 NOTE — TELEPHONE ENCOUNTER
Called and spoke to patient daughter and rescheduled patient for 01/28/2025 @ 11 AM because dad will be out of town for 01/16/2025
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
## 2025-01-10 LAB — RPR SER QL: NORMAL

## 2025-01-13 ENCOUNTER — OFFICE VISIT (OUTPATIENT)
Dept: PSYCHIATRY | Facility: CLINIC | Age: 60
End: 2025-01-13
Payer: COMMERCIAL

## 2025-01-13 VITALS
HEIGHT: 62 IN | HEART RATE: 100 BPM | WEIGHT: 175.69 LBS | SYSTOLIC BLOOD PRESSURE: 112 MMHG | DIASTOLIC BLOOD PRESSURE: 78 MMHG | BODY MASS INDEX: 32.33 KG/M2

## 2025-01-13 DIAGNOSIS — F33.1 MODERATE EPISODE OF RECURRENT MAJOR DEPRESSIVE DISORDER: Primary | ICD-10-CM

## 2025-01-13 DIAGNOSIS — F41.1 GENERALIZED ANXIETY DISORDER WITH PANIC ATTACKS: ICD-10-CM

## 2025-01-13 DIAGNOSIS — R45.89 LONELINESS: ICD-10-CM

## 2025-01-13 DIAGNOSIS — F43.20 ADJUSTMENT DISORDER, UNSPECIFIED TYPE: ICD-10-CM

## 2025-01-13 DIAGNOSIS — F41.0 GENERALIZED ANXIETY DISORDER WITH PANIC ATTACKS: ICD-10-CM

## 2025-01-13 PROCEDURE — G2211 COMPLEX E/M VISIT ADD ON: HCPCS | Mod: S$GLB,,, | Performed by: STUDENT IN AN ORGANIZED HEALTH CARE EDUCATION/TRAINING PROGRAM

## 2025-01-13 PROCEDURE — 3078F DIAST BP <80 MM HG: CPT | Mod: CPTII,S$GLB,, | Performed by: STUDENT IN AN ORGANIZED HEALTH CARE EDUCATION/TRAINING PROGRAM

## 2025-01-13 PROCEDURE — 96136 PSYCL/NRPSYC TST PHY/QHP 1ST: CPT | Mod: 59,S$GLB,, | Performed by: STUDENT IN AN ORGANIZED HEALTH CARE EDUCATION/TRAINING PROGRAM

## 2025-01-13 PROCEDURE — 3008F BODY MASS INDEX DOCD: CPT | Mod: CPTII,S$GLB,, | Performed by: STUDENT IN AN ORGANIZED HEALTH CARE EDUCATION/TRAINING PROGRAM

## 2025-01-13 PROCEDURE — 1160F RVW MEDS BY RX/DR IN RCRD: CPT | Mod: CPTII,S$GLB,, | Performed by: STUDENT IN AN ORGANIZED HEALTH CARE EDUCATION/TRAINING PROGRAM

## 2025-01-13 PROCEDURE — 3074F SYST BP LT 130 MM HG: CPT | Mod: CPTII,S$GLB,, | Performed by: STUDENT IN AN ORGANIZED HEALTH CARE EDUCATION/TRAINING PROGRAM

## 2025-01-13 PROCEDURE — 1159F MED LIST DOCD IN RCRD: CPT | Mod: CPTII,S$GLB,, | Performed by: STUDENT IN AN ORGANIZED HEALTH CARE EDUCATION/TRAINING PROGRAM

## 2025-01-13 PROCEDURE — 99999 PR PBB SHADOW E&M-EST. PATIENT-LVL III: CPT | Mod: PBBFAC,,, | Performed by: STUDENT IN AN ORGANIZED HEALTH CARE EDUCATION/TRAINING PROGRAM

## 2025-01-13 PROCEDURE — 99214 OFFICE O/P EST MOD 30 MIN: CPT | Mod: S$GLB,,, | Performed by: STUDENT IN AN ORGANIZED HEALTH CARE EDUCATION/TRAINING PROGRAM

## 2025-01-13 RX ORDER — CLONAZEPAM 0.5 MG/1
0.5 TABLET ORAL DAILY PRN
Qty: 30 TABLET | Refills: 1 | Status: SHIPPED | OUTPATIENT
Start: 2025-01-13

## 2025-01-13 RX ORDER — SERTRALINE HYDROCHLORIDE 100 MG/1
TABLET, FILM COATED ORAL
Qty: 30 TABLET | Refills: 1 | Status: SHIPPED | OUTPATIENT
Start: 2025-01-13

## 2025-01-13 RX ORDER — SERTRALINE HYDROCHLORIDE 25 MG/1
TABLET, FILM COATED ORAL
Qty: 30 TABLET | Refills: 1 | Status: SHIPPED | OUTPATIENT
Start: 2025-01-13

## 2025-01-13 NOTE — PROGRESS NOTES
"    Outpatient Psychiatry Followup Visit  1/13/2025  Assessment & Plan    Impression     ICD-10-CM ICD-9-CM   1. Moderate episode of recurrent major depressive disorder  F33.1 296.32   2. Adjustment disorder, unspecified type  F43.20 309.9   3. Generalized anxiety disorder with panic attacks  F41.1 300.02    F41.0 300.01   4. Loneliness  R45.89 V62.89   5. BMI 32.0-32.9,adult  Z68.32 V85.32      Plan of Care & Medication Management    Chart was reviewed. The risks and benefits of medication were discussed with pt. The treatment plan and followup plan were reviewed with pt. Pt understands to contact clinic if symptoms worsen. Pt understands to call 911 or go to nearest ER for suicidal ideation, intent or plan. Unless otherwise specified, pt did NOT display signs of nor endorse symptoms of overt psychosis or acute mood disorder requiring hospitalization during the encounter; pt denied violent thoughts or suicidal or homicidal ideation, intent, or plan.   RX History KLONOPIN, LEXAPRO ("I guess it worked out"), REXULTI (pt unsure of response), VALIUM, ZOLOFT   Current RX Continue KLONOPIN  10/31/2024 Signed contract, reviewed   Adjustments:  10/31/2024: Continue 0.5mg daily prn anxiety  Prior to 10/31/2024, pt had been taking 0.5mg daily prn anxiety  Increase ZOLOFT   Adjustments:  1/13/2025: Increase to 125mg HS  10/31/2024: Continue 100mg HS  Prior to 10/31/2024, pt had been taking 100mg HS      Education, Counseling & Monitoring []SLEEP HYGIENE  []THERAPY  []CONTRACT 1/13/2025?  [] REVIEWED 1/13/2025?  []NRT  []   Other Orders    RETURN K: STANDARD PROTOCOL: RETURN IN 4 WEEKS (ONE MONTH)  Continue medication management     Subjective    Available documentation has been reviewed, and pertinent elements of the chart have been incorporated into this note where appropriate. Last Epic encounter with writer was on 10/31/2024   Tammy Yao, a 59 y.o. female, presenting for followup visit. This visit was done in " "person, IN CLINIC.      Good holidays    Labs reviewed   Lipid profile NOT done    "Often feeling depressed"  More sad  NO crying spells    NO engagement in hobbies  Reduced interest  Reduced socialization    Low energy    Indecisiveness  Attention problems  Short term memory problems    Appetite wnl    Insomnia - onset 30-60min, 7h duration    More worry  Rumination  Seeks reassurance    In interim Neurology administered MOCA  Score is artificially reduced due to depression  MRI and NP testing ordered by Neurology  Worried about tests    Discussed increasing ZOLOFT  Pt was encouraged to keep therapy appointments  Increase visit frequency to q1m  Will continue treatment otherwise as planned        Objective    Mental Status and Physical Exam  1. Appearance: Dress is informal but appropriate. Motor activity normal.  2. Discourse: Clear speech with normal rate and volume. Associations intact. Orderly.  3. Emotional Expression: Anxious and depressed mood. Affect is appropriate.  4. Perception and Thinking: No hallucinations. No suicidality, no homicidality, delusions, or paranoia.  5. Sensorium: Grossly intact. Able to focus for interview.  6. Memory and Fund of Knowledge: Intact for content of interview.  7. Insight and Judgment: Intact.    Constitutional / General  Vitals:    01/13/25 1357   BP: 112/78   Pulse: 100   Weight: 79.7 kg (175 lb 11.3 oz)   Height: 5' 2" (1.575 m)     (Current body mass index is 32.14 kg/m².)         Measurement-Based Care (MBC):     Routine Instruments   PROMIS-ANXIETY Interpretation: 12 (4a raw score): T-SCORE 63.4; MODERATE using 55-60-70 cutoffs.   PROMIS-DEPRESSION Interpretation: 12 (4a raw score): T-SCORE 62.2; MODERATE using 55-60-70 cutoffs.   PSS4 Interpretation: 08/16; MODERATE using 6-11 cutoffs. 0 PH, 0 LSE.   Additional Instruments                  Billing Documentation:     Method of Encounter IN PERSON visit at the clinic, established   E/M Code 08597: FOLLOW UP VISIT, Rx " "mgmt, "UNSTABLE chronic illness(es) which require(s) a change in treatment"   Additional Codes and Modifiers     76912, with modifer 59: administered and scored more than one psychological or neuropsychological tests (see MBC above) (16+ mins)  , without modifiers -24,-25,-53: COMPLEXITY: Visit today included increased complexity associated with the care of the episodic problem(s) (multiple psychiatric disorders - see above) addressed and managing the longitudinal care of the patient due to the serious and/or complex managed problem(s) (multiple psychiatric disorders - see above).   Time N/A - Not billing for time        Andrew Swan DO  Department of Psychiatry, [facility]        "

## 2025-01-14 LAB
LC PHOSPHO-TAU (181P): 1.71 PG/ML (ref 0–0.97)
METHYLMALONATE SERPL-SCNC: 0.62 UMOL/L

## 2025-01-15 LAB — VIT B1 BLD-MCNC: 68 UG/L (ref 38–122)

## 2025-01-18 LAB — NEUROFILAMENT LIGHT CHAIN, PLASMA: 11.6 PG/ML

## 2025-01-28 ENCOUNTER — OFFICE VISIT (OUTPATIENT)
Dept: PSYCHIATRY | Facility: CLINIC | Age: 60
End: 2025-01-28
Payer: COMMERCIAL

## 2025-01-28 DIAGNOSIS — F41.0 GENERALIZED ANXIETY DISORDER WITH PANIC ATTACKS: ICD-10-CM

## 2025-01-28 DIAGNOSIS — F33.1 MODERATE EPISODE OF RECURRENT MAJOR DEPRESSIVE DISORDER: Primary | ICD-10-CM

## 2025-01-28 DIAGNOSIS — F41.1 GENERALIZED ANXIETY DISORDER WITH PANIC ATTACKS: ICD-10-CM

## 2025-01-28 PROCEDURE — 90834 PSYTX W PT 45 MINUTES: CPT | Mod: S$GLB,,, | Performed by: SOCIAL WORKER

## 2025-01-28 NOTE — PROGRESS NOTES
Individual Psychotherapy (PhD/LCSW)    1/28/2025    Site:  [place]        Therapeutic Intervention: Met with patient.  Outpatient - Supportive psychotherapy 45 min - CPT Code 71986 and Outpatient - Interactive psychotherapy 45 min - CPT code 93734    Chief complaint/reason for encounter: depression and anxiety   Medical history:   Past Medical History:   Diagnosis Date    Personal history of colonic polyps 06/05/2023       Medications:    Current Outpatient Medications:     clonazePAM (KLONOPIN) 0.5 MG tablet, Take 1 tablet (0.5 mg total) by mouth daily as needed for Anxiety., Disp: 30 tablet, Rfl: 1    diazePAM (VALIUM) 10 MG Tab, Take 1 tablet (10 mg total) by mouth On call Procedure (prior to MRI)., Disp: 1 tablet, Rfl: 0    sertraline (ZOLOFT) 100 MG tablet, Take by mouth 100mg tab with 25mg tab (125mg total) every evening., Disp: 30 tablet, Rfl: 1    sertraline (ZOLOFT) 25 MG tablet, Take by mouth 100mg tab with 25mg tab (125mg total) every evening., Disp: 30 tablet, Rfl: 1    Family history of psychiatric illness:   Family History   Problem Relation Name Age of Onset    Diabetes Mother      Dementia Mother      Cancer Father      Diabetes Maternal Grandmother      Depression Maternal Grandmother         Social history (marriage, employment, etc.): From Dr. Swan's notes on 10/31/2024:  Diagnosed generalized anxiety disorder with recent exacerbation  Identified adjustment disorder related to daughter's engagement and impending departure from home  Determined social isolation as a key factor contributing to current symptoms  Continued ZOLOFT 100mg without adjustments  Continued KLONOPIN as needed for anxiety.     Initial:  Tammy presents on time for today's initial.  Her  Maynor accompanies her.  She would like Maynor present however I ask to meet with her alone.  At first she is upset and resistant, however  encourages her to see clinician.  I note that she is periodically shaking, grabbing her legs  "and arms, appears distressed, speaks to being nervous, feels better when she is able to stand and explain that standing is okay.  She is 59 year old  female,  to  since .  She is unsure of his profession.  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  They have two daughters: Rosie who is 32 and Ewa who is 25.  Ewa resides with them and she works as a "little teacher".  They have one 5 year old grandson named Yoni (Rosie's son).   She was raised in [place] on [address].  Likes to cook Italian food and says her meatballs and spaghetti are very good.  She is one of two children:  Sridevi is her older sister.  She admits that Sridevi can be "bossy".  Both parents are .  Father was a  and predeceased her mother.  Believes mother suffered from depression.  Describes childhood as happy and denies hx of childhood abuse of any kind.  Denies any self-harming activities, attempted suicides, psych hospitalizations, street drugs or alcohol abuse and repeatedly states "I don't know" when ask why she is anxious, and what is her thinking, thoughts that are driving the anxiety.  Does admit that she fears "being alone" and wants someone to be with her.  Admits she is fearful of being in her home when  is away (he is away one week, returns home for one week, and then is off again).  Engaged Tammy in [employer] and does well.  Gave her a Breath stone.  Briefly met with her  Maynor (62) and explain when I want to see her again and what her homework is.  He appears kind and amenable to helping.  Will put in referral to Dr. Swan for assessment and med mgmt.  Tammy agrees to engage in therapy and have scheduled her for a follow up prior to her exiting the clinic.  Will see her as scheduled.      Session on 2024:  Tammy presents today with  Vincent Washburn" for a " "mutual session. She has good eye contact, speech is direct, behavior is agitated, nervous, jerky movements throughout the session, stands on occasion as experiences difficulty in remaining sitting, mood is highly anxious, although stable. Denies suicidal or homicidal thoughts. No self harming activities noted.  Maynor is able to engage in this session to help clinician have a better understanding of Tammy's condition, when it started and events noted at the start. Mother and mother in law with whom she was very close both  within one year of each other. Problems with oldest daughter Rosie where Tammy gave Rosie part of her inheritance from her mother's estate, roughly $50,000, to help Rosie keep her home (Rosie works part time and her fiancee is unemployed) may have contributed to this. Worries surrounding youngest daughter Suma surrounding Suma's former ernestoe also surfaced although both note that Suma is now engaged to a nice  from [place] and doing very well. Maynor believes these events may have played a part in Tammy's extreme anxious mood. Tammy admits during the session that sometimes "I think thoughts that I should not think" however is not able to speak to what those thoughts are. Explain thoughts, feelings and resulting behaviors. Explain brain's functions relating to thoughts and emotions. Appears to be listening. Explain Challenge the Thought and first knowing what the thought is. Signs releases so I may secure records from her psychiatrist who is [facility]. Also signs Involvement of Care form so I may communicate with her . Will see her as scheduled. Goals: take her meds (Zoloft will go from 50 to 100 mgs when she next sees Dr. Babatunde Abraham with [facility] in [place]; attend to therapy sessions with clinician are the two primary goals. Will see her as scheduled.      2024 session: Tammy presents with good " "eye contact, direct speech, thought processes intact, goal directed, oriented x3, behavior cooperative, less shaky movements, is smiling more, this time  Maynor stays in reception area, she is able to come into my clinic office by herself, she feels more comfortable as she accompanies me.  Engage her in the breath.  Give her a breath stone.  Review Thoughts, Feelings, Resulting Behaviors.  Speaks to not giving Rosie any more monies from her inheritance.  Question her whether she expects Rosie to repay her, she states she hopes she will however not clear.  If she does, urge her to accept same.  We speak to Ewa, and how well she is doing and she agrees.  Children are a major concern for Tammy.  We speak to the deaths of her mother and mother in law coming in such close proximity to each other and how jarring this much have been for her and she agrees.  Escort her back and she is calmer, will see her as scheduled.  She is making progress.       Session on 9/24/2024:   "Sometimes I think of stuff and I get nervous" press her on what thoughts, remind her of leading to feelings, then resulting actions, gets it.  Engages in negative thinking.  Switch to what she is grateful for.  Give her Gratitude Journal with caveat must write down each day until next session and not repeat herself.       Social History   Session on 12/31/2024: Tammy presents on time for follow up and I note that she is smiling, has make up, is much more talkative, no shaking, admits to feeling and coping better.  Feels she is on the right track.      Social History     Tobacco Use    Smoking status: Never    Smokeless tobacco: Never   Substance Use Topics    Alcohol use: Yes     Comment: Occ    Drug use: Never       Interval history and content of current session:   Tammy presents on time with good eye contact, direct speech, mood is stable, thought processes intact, denies homicidal/suicidal ideation, denies too many bad days, behavior " is most cooperative.  Dressed nicely, neatly, has make up on, is smiling, pleasant, able to converse well, laughs occasionally, a guerin difference from when I first met her.  Speaks to some bad days, not all days are bad, wishes she could dispel her anxiety when she alone and has little to do.  We speak to having a purpose, gave her a list of different groups, including [employer] close to her home [facility] for possible volunteer work (also [employer] and [email or web address]).  Encourage starting a garden as she enjoys same.  Speaks to youngest daughter getting  and moving out, admits to sadness, we speak to this at length, seems to lighten her mood that it is okay to feel sadness and recognize that she did a good job as a parent as her child is getting ready to start her own family.  Is taking her meds as prescribed.  They are helping.  We speak to automatic thoughts, rewiring the brain through Mindfulness, staying in the moment, having a purpose, and practicing the Breath.  Scheduled follow up.      Treatment plan:  Target symptoms: recurrent depression, anxiety   Why chosen therapy is appropriate versus another modality: relevant to diagnosis, patient responds to this modality, evidence based practice  Outcome monitoring methods: self-report, observation  Therapeutic intervention type: insight oriented psychotherapy, behavior modifying psychotherapy, supportive psychotherapy    Risk parameters:  Patient reports no suicidal ideation  Patient reports no homicidal ideation  Patient reports no self-injurious behavior  Patient reports no violent behavior    Verbal deficits: None    Patient's response to intervention:  The patient's response to intervention is accepting, motivated.    Progress toward goals and other mental status changes:  The patient's progress toward goals is good.    Diagnosis:   1. Moderate episode of recurrent major depressive disorder    2. Generalized anxiety disorder with panic  attacks        Plan:  individual psychotherapy    Return to clinic: as scheduled    Length of Service (minutes): 45

## 2025-02-05 ENCOUNTER — PATIENT MESSAGE (OUTPATIENT)
Dept: NEUROLOGY | Facility: CLINIC | Age: 60
End: 2025-02-05
Payer: COMMERCIAL

## 2025-02-05 ENCOUNTER — TELEPHONE (OUTPATIENT)
Dept: RADIOLOGY | Facility: HOSPITAL | Age: 60
End: 2025-02-05

## 2025-02-06 ENCOUNTER — HOSPITAL ENCOUNTER (OUTPATIENT)
Dept: RADIOLOGY | Facility: HOSPITAL | Age: 60
Discharge: HOME | End: 2025-02-06
Attending: NURSE PRACTITIONER
Payer: COMMERCIAL

## 2025-02-06 DIAGNOSIS — R41.3 OTHER AMNESIA: ICD-10-CM

## 2025-02-06 PROCEDURE — 70553 MRI BRAIN STEM W/O & W/DYE: CPT | Mod: 26,,, | Performed by: RADIOLOGY

## 2025-02-06 PROCEDURE — A9585 GADOBUTROL INJECTION: HCPCS

## 2025-02-06 PROCEDURE — 25500020 PHARM REV CODE 255

## 2025-02-06 PROCEDURE — 70553 MRI BRAIN STEM W/O & W/DYE: CPT | Mod: TC

## 2025-02-06 RX ORDER — GADOBUTROL 604.72 MG/ML
INJECTION INTRAVENOUS
Status: COMPLETED
Start: 2025-02-06 | End: 2025-02-06

## 2025-02-06 RX ADMIN — GADOBUTROL 7 ML: 604.72 INJECTION INTRAVENOUS at 10:02

## 2025-02-10 ENCOUNTER — OFFICE VISIT (OUTPATIENT)
Dept: PSYCHIATRY | Facility: CLINIC | Age: 60
End: 2025-02-10
Payer: COMMERCIAL

## 2025-02-10 ENCOUNTER — TELEPHONE (OUTPATIENT)
Dept: PSYCHIATRY | Facility: CLINIC | Age: 60
End: 2025-02-10
Payer: COMMERCIAL

## 2025-02-10 VITALS
SYSTOLIC BLOOD PRESSURE: 98 MMHG | HEIGHT: 62 IN | DIASTOLIC BLOOD PRESSURE: 66 MMHG | HEART RATE: 84 BPM | BODY MASS INDEX: 32.54 KG/M2 | WEIGHT: 176.81 LBS

## 2025-02-10 DIAGNOSIS — F33.1 MODERATE EPISODE OF RECURRENT MAJOR DEPRESSIVE DISORDER: Primary | ICD-10-CM

## 2025-02-10 DIAGNOSIS — F41.0 GENERALIZED ANXIETY DISORDER WITH PANIC ATTACKS: ICD-10-CM

## 2025-02-10 DIAGNOSIS — R45.89 LONELINESS: ICD-10-CM

## 2025-02-10 DIAGNOSIS — F41.1 GENERALIZED ANXIETY DISORDER WITH PANIC ATTACKS: ICD-10-CM

## 2025-02-10 DIAGNOSIS — F43.20 ADJUSTMENT DISORDER, UNSPECIFIED TYPE: ICD-10-CM

## 2025-02-10 PROCEDURE — 3074F SYST BP LT 130 MM HG: CPT | Mod: CPTII,S$GLB,, | Performed by: STUDENT IN AN ORGANIZED HEALTH CARE EDUCATION/TRAINING PROGRAM

## 2025-02-10 PROCEDURE — 3078F DIAST BP <80 MM HG: CPT | Mod: CPTII,S$GLB,, | Performed by: STUDENT IN AN ORGANIZED HEALTH CARE EDUCATION/TRAINING PROGRAM

## 2025-02-10 PROCEDURE — 3008F BODY MASS INDEX DOCD: CPT | Mod: CPTII,S$GLB,, | Performed by: STUDENT IN AN ORGANIZED HEALTH CARE EDUCATION/TRAINING PROGRAM

## 2025-02-10 PROCEDURE — 96136 PSYCL/NRPSYC TST PHY/QHP 1ST: CPT | Mod: 59,S$GLB,, | Performed by: STUDENT IN AN ORGANIZED HEALTH CARE EDUCATION/TRAINING PROGRAM

## 2025-02-10 PROCEDURE — 1159F MED LIST DOCD IN RCRD: CPT | Mod: CPTII,S$GLB,, | Performed by: STUDENT IN AN ORGANIZED HEALTH CARE EDUCATION/TRAINING PROGRAM

## 2025-02-10 PROCEDURE — 1160F RVW MEDS BY RX/DR IN RCRD: CPT | Mod: CPTII,S$GLB,, | Performed by: STUDENT IN AN ORGANIZED HEALTH CARE EDUCATION/TRAINING PROGRAM

## 2025-02-10 PROCEDURE — 99999 PR PBB SHADOW E&M-EST. PATIENT-LVL III: CPT | Mod: PBBFAC,,, | Performed by: STUDENT IN AN ORGANIZED HEALTH CARE EDUCATION/TRAINING PROGRAM

## 2025-02-10 PROCEDURE — G2211 COMPLEX E/M VISIT ADD ON: HCPCS | Mod: S$GLB,,, | Performed by: STUDENT IN AN ORGANIZED HEALTH CARE EDUCATION/TRAINING PROGRAM

## 2025-02-10 PROCEDURE — 99214 OFFICE O/P EST MOD 30 MIN: CPT | Mod: S$GLB,,, | Performed by: STUDENT IN AN ORGANIZED HEALTH CARE EDUCATION/TRAINING PROGRAM

## 2025-02-10 RX ORDER — SERTRALINE HYDROCHLORIDE 50 MG/1
TABLET, FILM COATED ORAL
Qty: 30 TABLET | Refills: 1 | Status: SHIPPED | OUTPATIENT
Start: 2025-02-10

## 2025-02-10 RX ORDER — SERTRALINE HYDROCHLORIDE 100 MG/1
TABLET, FILM COATED ORAL
Qty: 30 TABLET | Refills: 1 | Status: SHIPPED | OUTPATIENT
Start: 2025-02-10

## 2025-02-10 NOTE — TELEPHONE ENCOUNTER
Rec'd VM from pt's daughter Rosie requesting to reschedule appt with Tammy on 2/17/25. Pt does not have transportation.

## 2025-02-10 NOTE — PROGRESS NOTES
"    Outpatient Psychiatry Followup Visit  2/10/2025  Assessment & Plan    Impression     ICD-10-CM ICD-9-CM   1. Moderate episode of recurrent major depressive disorder  F33.1 296.32   2. Adjustment disorder, unspecified type  F43.20 309.9   3. Generalized anxiety disorder with panic attacks  F41.1 300.02    F41.0 300.01   4. Loneliness  R45.89 V62.89   5. BMI 32.0-32.9,adult  Z68.32 V85.32      Plan of Care & Medication Management    Chart was reviewed. The risks and benefits of medication were discussed with pt. The treatment plan and followup plan were reviewed with pt. Pt understands to contact clinic if symptoms worsen. Pt understands to call 911 or go to nearest ER for suicidal ideation, intent or plan. Unless otherwise specified, pt did NOT display signs of nor endorse symptoms of overt psychosis or acute mood disorder requiring hospitalization during the encounter; pt denied violent thoughts or suicidal or homicidal ideation, intent, or plan.   RX History KLONOPIN, LEXAPRO ("I guess it worked out"), REXULTI (pt unsure of response), VALIUM, ZOLOFT   Current RX Continue KLONOPIN  10/31/2024 Signed contract, reviewed   Adjustments:  10/31/2024: Continue 0.5mg daily prn anxiety  Prior to 10/31/2024, pt had been taking 0.5mg daily prn anxiety  Increase ZOLOFT   Adjustments:  2/10/2025: Increase to 150mg HS  1/13/2025: Increase to 125mg HS  10/31/2024: Continue 100mg HS  Prior to 10/31/2024, pt had been taking 100mg HS      Education, Counseling & Monitoring []SLEEP HYGIENE  []THERAPY  []CONTRACT 2/10/2025?  [] REVIEWED 2/10/2025?  []NRT  []   Other Orders    RETURN L: STANDARD PROTOCOL: RETURN IN 4 WEEKS (ONE MONTH)       Subjective    Available documentation has been reviewed, and pertinent elements of the chart have been incorporated into this note where appropriate. Last Epic encounter with writer was on 1/13/2025   Tammy Yao, a 59 y.o. female, presenting for followup visit. This visit was done in " "person, IN CLINIC.      Anxiety  "Scared feelings" whenever feeling alone    Staying busy  Feeling depressed  "I don't want to do things"  "I'm kind of sad"    Tolerated ZOLOFT increase  Notices little difference  Seems to be adherent to pharmacotherapy    MRI unremarkable    Sleep unchanged    Taking ORAL B12 in the morning    Pt was encouraged to keep therapy appointments   Discussed increasing ZOLOFT  Considering adding adjunctive agent    Will continue treatment otherwise as planned        Objective    Mental Status and Physical Exam  1. Appearance: Dress is informal but appropriate. Motor activity normal.  2. Discourse: Clear speech with normal rate and volume. Associations intact. Orderly.  3. Emotional Expression: Anxious and depressed mood. Affect is appropriate.  4. Perception and Thinking: No hallucinations. No suicidality, no homicidality, delusions, or paranoia.  5. Sensorium: Grossly intact. Able to focus for interview.  6. Memory and Fund of Knowledge: Intact for content of interview.  7. Insight and Judgment: Intact.    Constitutional / General  Vitals:    02/10/25 1313   BP: 98/66   Pulse: 84   Weight: 80.2 kg (176 lb 12.9 oz)   Height: 5' 2" (1.575 m)     (Current body mass index is 32.34 kg/m².)         Measurement-Based Care (MBC):     Routine Instruments   PROMIS-ANXIETY Interpretation: 14 (4a raw score): T-SCORE 67.3; MODERATE using 55-60-70 cutoffs.   PROMIS-DEPRESSION Interpretation: 12 (4a raw score): T-SCORE 62.2; MODERATE using 55-60-70 cutoffs.   PSS4 Interpretation: 06/16; MODERATE using 6-11 cutoffs. 0 PH, 0 LSE.   Additional Instruments   MBC ANCHOR : about the same         Auto-populated Chart Data:     The chart was reviewed for recent diagnostic procedures and investigations, and pertinent results are noted below.   Encounter Medications  Outpatient Encounter Medications as of 2/10/2025   Medication Sig Dispense Refill    clonazePAM (KLONOPIN) 0.5 MG tablet Take 1 tablet (0.5 mg " total) by mouth daily as needed for Anxiety. 30 tablet 1    [DISCONTINUED] diazePAM (VALIUM) 10 MG Tab Take 1 tablet (10 mg total) by mouth On call Procedure (prior to MRI). 1 tablet 0    [DISCONTINUED] sertraline (ZOLOFT) 100 MG tablet Take by mouth 100mg tab with 25mg tab (125mg total) every evening. 30 tablet 1    [DISCONTINUED] sertraline (ZOLOFT) 25 MG tablet Take by mouth 100mg tab with 25mg tab (125mg total) every evening. 30 tablet 1    sertraline (ZOLOFT) 100 MG tablet Take by mouth 100mg tab with 50mg tab (150mg total) every evening. 30 tablet 1    sertraline (ZOLOFT) 50 MG tablet Take by mouth 100mg tab with 50mg tab (150mg total) every evening. 30 tablet 1    [] gadobutroL (GADAVIST) 10 mmol/10 mL (1 mmol/mL) injection        No facility-administered encounter medications on file as of 2/10/2025.      Cardiometabolic  EKG Results  No results found for this or any previous visit.     Labs  Lab Results   Component Value Date    TSH 2.255 2025    GLU 75 2025    CHOL 293 (H) 2023    TRIG 65 2023    HDL 91 (H) 2023    LDLCALC 189.0 (H) 2023       BMI Trend - (Current body mass index is 32.34 kg/m².)  Wt Readings from Last 7 Encounters:   02/10/25 80.2 kg (176 lb 12.9 oz)   25 79.7 kg (175 lb 11.3 oz)   25 80.3 kg (177 lb 0.5 oz)   24 79.4 kg (175 lb)   10/31/24 79.8 kg (175 lb 14.8 oz)   10/17/24 80.3 kg (177 lb)   24 85 kg (187 lb 6.4 oz)       BP/HR Trend   BP Readings from Last 3 Encounters:   02/10/25 98/66   25 112/78   25 112/74      Pulse Readings from Last 3 Encounters:   02/10/25 84   25 100   25 109       Endocrine Lab Results   Component Value Date    TSH 2.255 2025      Hematologic   Lab Results   Component Value Date    WBC 8.73 2025    RBC 4.65 2025    HGB 13.6 2025    HCT 44.5 2025    MCV 96 2025    MCH 29.2 2025    RDW 13.7 2025     2025    MPV  "10.4 01/09/2025    GRAN 5.6 01/09/2025    GRAN 64.7 01/09/2025      Hepatorenal Lab Results   Component Value Date     01/09/2025    K 4.0 01/09/2025    CALCIUM 9.7 01/09/2025    CO2 21 (L) 01/09/2025    ANIONGAP 10 01/09/2025    CREATININE 0.8 01/09/2025    BUN 11 01/09/2025    EGFRNORACEVR >60.0 01/09/2025    AST 17 01/09/2025    ALT 16 01/09/2025    BILITOT 0.3 01/09/2025    ALBUMIN 3.9 01/09/2025      Medication Level No results found for: "LITHIUM", "VALPROATE", "CBMZ", "CARBAMAZ", "LAMOTRIGINE", "PHENYTOIN", "PHENOBARB", "CLOZAPINE", "NORCLOZAP", "CLOZNORCLOZ", "CLONAZEPAM", "NESHA", "VENLAFAXINE", "NORTRIP", "OXCARBAZ"   Other Labs Lab Results   Component Value Date    THIAMINEBLOO 68 01/09/2025    CXGHXOFP44 382 01/09/2025    HEPCAB Non Reactive 04/28/2023    PEA70UORX Non-reactive 01/09/2025    RPR Non-reactive 01/09/2025      Drug Screening   AMP * (*) METHAMP * (*)   MODESTO * (*) MORPH * (*)   BUP * (*) OXY * (*)   BENZO * (*) METHADONE * (*)   MENDEZ * (*) THC * (*)   HX No results found for: "AMPHETAMINES", "PCDSOPHENCYN", "COCAINEMETAB", "POCCOC", "COCAINE", "MENDEZ", "COCAINEURINE", "POCCOCDRUG", "PCDSUTRAMA", "PCDSOBENZOD", "BARBITURATES", "PCDSCOMETHA", "OPIATESCREEN", "PCDSUBUPRE", "PCDSUFENTANY", "PCDSUOXYCOD", "BUPRENORPH", "NORBUPRENOR", "MARIJUANATHC"  No results found for: "PCDSOALCOHOL", "ALCOHOLMEDIC", "THEYLGLUCU", "ETHYLSULF", "[id number]", "PETH"         Billing Documentation:     Method of Encounter IN PERSON visit at the clinic, established   E/M Code 63455: FOLLOW UP VISIT, Rx mgmt, "Multiple STABLE chronic illnesses"   Additional Codes and Modifiers     38927, with modifer 59: administered and scored more than one psychological or neuropsychological tests (see MBC above) (16+ mins)  , without modifiers -24,-25,-53: COMPLEXITY: Visit today included increased complexity associated with the care of the episodic problem(s) (multiple psychiatric disorders - see above) addressed and " managing the longitudinal care of the patient due to the serious and/or complex managed problem(s) (multiple psychiatric disorders - see above).   Time N/A - Not billing for time        Andrew Swan DO  Department of Psychiatry, [facility]

## 2025-02-10 NOTE — PATIENT INSTRUCTIONS
Increase ZOLOFT to 150mg NIGHTLY  Continue other medicine as prescribed   Please keep therapy appointments

## 2025-02-19 ENCOUNTER — TELEPHONE (OUTPATIENT)
Dept: PSYCHIATRY | Facility: CLINIC | Age: 60
End: 2025-02-19
Payer: COMMERCIAL

## 2025-02-19 NOTE — TELEPHONE ENCOUNTER
Called and left message to call office need to get visit on 02/24/2025 canceled and rescheduled to call office

## 2025-03-06 ENCOUNTER — TELEPHONE (OUTPATIENT)
Dept: NEUROLOGY | Facility: CLINIC | Age: 60
End: 2025-03-06
Payer: COMMERCIAL

## 2025-03-06 NOTE — TELEPHONE ENCOUNTER
----- Message from Tamar sent at 3/6/2025 12:10 PM CST -----  Regarding: R/S Appt  Contact: Rosie ( Daughter)  RESCHEDULE/APPTSCurrent Appt Date:  03/13/2025Type of Appt:  Cesar:  Kristine for Scheduling:  Pts spouse will be out of town and he's her transportation. No appts avail. Caller:  Rosie ( Daughter)Contact Preference:  [phone]

## 2025-03-11 ENCOUNTER — OFFICE VISIT (OUTPATIENT)
Dept: PSYCHIATRY | Facility: CLINIC | Age: 60
End: 2025-03-11
Payer: COMMERCIAL

## 2025-03-11 VITALS
WEIGHT: 178.88 LBS | BODY MASS INDEX: 32.72 KG/M2 | SYSTOLIC BLOOD PRESSURE: 110 MMHG | HEART RATE: 83 BPM | DIASTOLIC BLOOD PRESSURE: 71 MMHG

## 2025-03-11 DIAGNOSIS — F41.0 GENERALIZED ANXIETY DISORDER WITH PANIC ATTACKS: ICD-10-CM

## 2025-03-11 DIAGNOSIS — R45.89 LONELINESS: ICD-10-CM

## 2025-03-11 DIAGNOSIS — F33.1 MODERATE EPISODE OF RECURRENT MAJOR DEPRESSIVE DISORDER: Primary | ICD-10-CM

## 2025-03-11 DIAGNOSIS — F41.1 GENERALIZED ANXIETY DISORDER WITH PANIC ATTACKS: ICD-10-CM

## 2025-03-11 PROBLEM — F43.20 ADJUSTMENT DISORDER: Status: RESOLVED | Noted: 2024-05-13 | Resolved: 2025-03-11

## 2025-03-11 PROCEDURE — 3008F BODY MASS INDEX DOCD: CPT | Mod: CPTII,S$GLB,, | Performed by: STUDENT IN AN ORGANIZED HEALTH CARE EDUCATION/TRAINING PROGRAM

## 2025-03-11 PROCEDURE — 90834 PSYTX W PT 45 MINUTES: CPT | Mod: S$GLB,,, | Performed by: SOCIAL WORKER

## 2025-03-11 PROCEDURE — 96136 PSYCL/NRPSYC TST PHY/QHP 1ST: CPT | Mod: 59,S$GLB,, | Performed by: STUDENT IN AN ORGANIZED HEALTH CARE EDUCATION/TRAINING PROGRAM

## 2025-03-11 PROCEDURE — 99999 PR PBB SHADOW E&M-EST. PATIENT-LVL III: CPT | Mod: PBBFAC,,, | Performed by: STUDENT IN AN ORGANIZED HEALTH CARE EDUCATION/TRAINING PROGRAM

## 2025-03-11 PROCEDURE — 3074F SYST BP LT 130 MM HG: CPT | Mod: CPTII,S$GLB,, | Performed by: STUDENT IN AN ORGANIZED HEALTH CARE EDUCATION/TRAINING PROGRAM

## 2025-03-11 PROCEDURE — 1159F MED LIST DOCD IN RCRD: CPT | Mod: CPTII,S$GLB,, | Performed by: STUDENT IN AN ORGANIZED HEALTH CARE EDUCATION/TRAINING PROGRAM

## 2025-03-11 PROCEDURE — 1160F RVW MEDS BY RX/DR IN RCRD: CPT | Mod: CPTII,S$GLB,, | Performed by: STUDENT IN AN ORGANIZED HEALTH CARE EDUCATION/TRAINING PROGRAM

## 2025-03-11 PROCEDURE — 99214 OFFICE O/P EST MOD 30 MIN: CPT | Mod: S$GLB,,, | Performed by: STUDENT IN AN ORGANIZED HEALTH CARE EDUCATION/TRAINING PROGRAM

## 2025-03-11 PROCEDURE — G2211 COMPLEX E/M VISIT ADD ON: HCPCS | Mod: S$GLB,,, | Performed by: STUDENT IN AN ORGANIZED HEALTH CARE EDUCATION/TRAINING PROGRAM

## 2025-03-11 PROCEDURE — 3078F DIAST BP <80 MM HG: CPT | Mod: CPTII,S$GLB,, | Performed by: STUDENT IN AN ORGANIZED HEALTH CARE EDUCATION/TRAINING PROGRAM

## 2025-03-11 RX ORDER — SERTRALINE HYDROCHLORIDE 100 MG/1
200 TABLET, FILM COATED ORAL DAILY
Qty: 60 TABLET | Refills: 1 | Status: SHIPPED | OUTPATIENT
Start: 2025-03-11 | End: 2025-05-10

## 2025-03-11 RX ORDER — CLONAZEPAM 0.5 MG/1
0.5 TABLET ORAL DAILY PRN
Qty: 30 TABLET | Refills: 1 | Status: SHIPPED | OUTPATIENT
Start: 2025-03-11

## 2025-03-11 NOTE — PATIENT INSTRUCTIONS
Socialize more  Start exercise, start walking  Increase ZOLOFT to 200mg daily  Continue other medicine as prescribed

## 2025-03-11 NOTE — PROGRESS NOTES
"    Outpatient Psychiatry Followup Visit  3/11/2025  Assessment & Plan    Impression     ICD-10-CM ICD-9-CM   1. Moderate episode of recurrent major depressive disorder  F33.1 296.32   2. Generalized anxiety disorder with panic attacks  F41.1 300.02    F41.0 300.01   3. Loneliness  R45.89 V62.89   4. BMI 32.0-32.9,adult  Z68.32 V85.32      Plan of Care & Medication Management    Chart was reviewed. The risks and benefits of medication were discussed with pt. The treatment plan and followup plan were reviewed with pt. Pt understands to contact clinic if symptoms worsen. Pt understands to call 911 or go to nearest ER for suicidal ideation, intent or plan. Unless otherwise specified, pt did NOT display signs of nor endorse symptoms of overt psychosis or acute mood disorder requiring hospitalization during the encounter; pt denied violent thoughts or suicidal or homicidal ideation, intent, or plan.   RX History KLONOPIN, LEXAPRO ("I guess it worked out"), REXULTI (pt unsure of response), VALIUM, ZOLOFT   Current RX Continue KLONOPIN  10/31/2024 Signed contract, reviewed   Adjustments:  10/31/2024: Continue 0.5mg daily prn anxiety  Prior to 10/31/2024, pt had been taking 0.5mg daily prn anxiety  Increase ZOLOFT   Adjustments:  3/11/2025: increase to 200mg hs   2/10/2025: Increase to 150mg HS  1/13/2025: Increase to 125mg HS  10/31/2024: Continue 100mg HS  Prior to 10/31/2024, pt had been taking 100mg HS      Education, Counseling & Monitoring []SLEEP HYGIENE  []THERAPY  []CONTRACT 3/11/2025?  [x] REVIEWED 3/11/2025?  []NRT  []   Other Orders Continue individual psychotherapy   RETURN M: STANDARD PROTOCOL: RETURN IN 4 WEEKS (ONE MONTH)       Subjective    Available documentation has been reviewed, and pertinent elements of the chart have been incorporated into this note where appropriate. Last Epic encounter with writer was on 2/10/2025   Tammy Yao, a 60 y.o. female, presenting for followup visit. This visit " "was done in person, IN CLINIC.      "I get anxious"  "I don't want to be alone"  "It's like I'm scared something is going to happen"    Fear of abandonment  Loneliness    Keeping therapy appointments     Taking ZOLOFT 150mg HS  Sleep has improved    Memory concerns likely secondary to depression    Using KLONOPIN daily    Discussed increasing ZOLOFT to 200mg HS  Encouraged socialization  Pt was encouraged to keep therapy appointments  Continue treatment otherwise as planned        Objective    Vitals:    03/11/25 1312   BP: 110/71   Pulse: 83   Weight: 81.1 kg (178 lb 14.5 oz)     (Current body mass index is 32.72 kg/m².)    MSE/PE  1. Appearance: Dress is informal but appropriate. Motor activity normal.  2. Discourse: Clear speech with normal rate and volume. Associations intact. Orderly.  3. Emotional Expression: Anxious and depressed mood. Affect is appropriate.  4. Perception and Thinking: No hallucinations. No suicidality, no homicidality, delusions, or paranoia.  5. Sensorium: Grossly intact. Able to focus for interview.  6. Memory and Fund of Knowledge: Intact for content of interview.  7. Insight and Judgment: Intact.       Measurement-Based Care (MBC):     Routine Instruments   PROMIS-ANXIETY Interpretation: 12 (4a raw score): T-SCORE 63.4; MODERATE using 55-60-70 cutoffs.   PROMIS-DEPRESSION Interpretation: 11 (4a raw score): T-SCORE 60.5; MODERATE using 55-60-70 cutoffs.   PSS4 Interpretation: 08/16; MODERATE using 6-11 cutoffs. 0 PH, 0 LSE.   Additional Instruments   MBC ANCHOR : about the same         Auto-populated chart data omitted from this note for brevity.      Billing Documentation:     Method of Encounter IN PERSON visit at the clinic, established   E/M Code 03444: FOLLOW UP VISIT, Rx mgmt, "Multiple STABLE chronic illnesses"   Additional Codes and Modifiers     90889, with modifer 59: administered and scored more than one psychological or neuropsychological tests (see MBC above) (16+ " mins)  , without modifiers -24,-25,-53: COMPLEXITY: Visit today included increased complexity associated with the care of the episodic problem(s) (multiple psychiatric disorders - see above) addressed and managing the longitudinal care of the patient due to the serious and/or complex managed problem(s) (multiple psychiatric disorders - see above).   Time N/A - Not billing for time        Andrew Swan DO  Department of Psychiatry, [facility]

## 2025-03-11 NOTE — PROGRESS NOTES
"Individual Psychotherapy (PhD/LCSW)    3/11/2025    Site:  [facility]        Therapeutic Intervention: Met with patient.  Outpatient - Supportive psychotherapy 45 min - CPT Code 12269 and Outpatient - Interactive psychotherapy 45 min - CPT code 59512    Chief complaint/reason for encounter: depression (partial remission) and anxiety     Medical history:   Past Medical History:   Diagnosis Date    Personal history of colonic polyps 06/05/2023       Medications:  Current Medications[1]    Family history of psychiatric illness:   Family History   Problem Relation Name Age of Onset    Diabetes Mother      Dementia Mother      Cancer Father      Diabetes Maternal Grandmother      Depression Maternal Grandmother         Social history (marriage, employment, etc.): From Dr. Swan's notes on 10/31/2024:  Diagnosed generalized anxiety disorder with recent exacerbation  Identified adjustment disorder related to daughter's engagement and impending departure from home  Determined social isolation as a key factor contributing to current symptoms  Continued ZOLOFT 100mg without adjustments  Continued KLONOPIN as needed for anxiety.     Initial:  Tammy presents on time for today's initial.  Her  Maynor accompanies her.  She would like Maynor present however I ask to meet with her alone.  At first she is upset and resistant, however  encourages her to see clinician.  I note that she is periodically shaking, grabbing her legs and arms, appears distressed, speaks to being nervous, feels better when she is able to stand and explain that standing is okay.  She is 59 year old  female,  to  since 1985.  She is unsure of his profession.  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  They have two daughters: Rosie who is 32 and Ewa who is 25.  Ewa resides with them and she works as a "little teacher".  They " "have one 5 year old grandson named Yoni (Rosie's son).   She was raised in [place] on [address] [facility].  Likes to cook Italian food and says her meatballs and spaghetti are very good.  She is one of two children:  Sridevi is her older sister.  She admits that Sridevi can be "bossy".  Both parents are .  Father was a  and predeceased her mother.  Believes mother suffered from depression.  Describes childhood as happy and denies hx of childhood abuse of any kind.  Denies any self-harming activities, attempted suicides, psych hospitalizations, street drugs or alcohol abuse and repeatedly states "I don't know" when ask why she is anxious, and what is her thinking, thoughts that are driving the anxiety.  Does admit that she fears "being alone" and wants someone to be with her.  Admits she is fearful of being in her home when  is away (he is away one week, returns home for one week, and then is off again).  Engaged Tammy in DDB and does well.  Gave her a Breath stone.  Briefly met with her  Maynor (62) and explain when I want to see her again and what her homework is.  He appears kind and amenable to helping.  Will put in referral to Dr. Swan for assessment and med mgmt.  Tammy agrees to engage in therapy and have scheduled her for a follow up prior to her exiting the clinic.  Will see her as scheduled.      Session on 2024:  Tammy presents today with  Vincent Washburn" for a mutual session. She has good eye contact, speech is direct, behavior is agitated, nervous, jerky movements throughout the session, stands on occasion as experiences difficulty in remaining sitting, mood is highly anxious, although stable. Denies suicidal or homicidal thoughts. No self harming activities noted.  Maynor is able to engage in this session to help clinician have a better understanding of Tammy's condition, when it started and events noted at the start. Mother and mother in law with whom she " "was very close both  within one year of each other. Problems with oldest daughter Rosie where Tammy gave Rosie part of her inheritance from her mother's estate, roughly $50,000, to help Rosie keep her home (Rosie works part time and her fiancee is unemployed) may have contributed to this. Worries surrounding youngest daughter Suma surrounding Suma's former emily also surfaced although both note that Suma is now engaged to a nice  from [place] and doing very well. Maynor believes these events may have played a part in Tammy's extreme anxious mood. Tammy admits during the session that sometimes "I think thoughts that I should not think" however is not able to speak to what those thoughts are. Explain thoughts, feelings and resulting behaviors. Explain brain's functions relating to thoughts and emotions. Appears to be listening. Explain Challenge the Thought and first knowing what the thought is. Signs releases so I may secure records from her psychiatrist who is outside of the [facility] system. Also signs Involvement of Care form so I may communicate with her . Will see her as scheduled. Goals: take her meds (Zoloft will go from 50 to 100 mgs when she next sees Dr. Babatunde Abraham with [facility] in [place]; attend to therapy sessions with clinician are the two primary goals. Will see her as scheduled.      2024 session: Tammy presents with good eye contact, direct speech, thought processes intact, goal directed, oriented x3, behavior cooperative, less shaky movements, is smiling more, this time  Maynor stays in reception area, she is able to come into my clinic office by herself, she feels more comfortable as she accompanies me.  Engage her in the breath.  Give her a breath stone.  Review Thoughts, Feelings, Resulting Behaviors.  Speaks to not giving Rosie any more monies from her inheritance.  Question her whether she expects Rosie to " "repay her, she states she hopes she will however not clear.  If she does, urge her to accept same.  We speak to Ewa, and how well she is doing and she agrees.  Children are a major concern for Tammy.  We speak to the deaths of her mother and mother in law coming in such close proximity to each other and how jarring this much have been for her and she agrees.  Escort her back and she is calmer, will see her as scheduled.  She is making progress.       Session on 9/24/2024:   "Sometimes I think of stuff and I get nervous" press her on what thoughts, remind her of leading to feelings, then resulting actions, gets it.  Engages in negative thinking.  Switch to what she is grateful for.  Give her Gratitude Journal with caveat must write down each day until next session and not repeat herself.       Social History   Session on 12/31/2024: Tammy presents on time for follow up and I note that she is smiling, has make up, is much more talkative, no shaking, admits to feeling and coping better.  Feels she is on the right track.      Social History   Session on 1/28/2025:   Tammy presents on time with good eye contact, direct speech, mood is stable, thought processes intact, denies homicidal/suicidal ideation, denies too many bad days, behavior is most cooperative.  Dressed nicely, neatly, has make up on, is smiling, pleasant, able to converse well, laughs occasionally, a guerin difference from when I first met her.  Speaks to some bad days, not all days are bad, wishes she could dispel her anxiety when she alone and has little to do.  We speak to having a purpose, gave her a list of different groups, including [employer] close to her home [facility] for possible volunteer work (also [facility] and [facility]).  Encourage starting a garden as she enjoys same.  Speaks to youngest daughter getting  and moving out, admits to sadness, we speak to this at length, seems to lighten her mood that it is okay to feel " sadness and recognize that she did a good job as a parent as her child is getting ready to start her own family.  Is taking her meds as prescribed.  They are helping.  We speak to automatic thoughts, rewiring the brain through Mindfulness, staying in the moment, having a purpose, and practicing the Breath.  Scheduled follow up.         Interval history and content of current session:   Tammy presents on time for today's follow up.  States meds are helping to control her anxious mood.  Does admit to difficulty in recalling things, such as what does  do for work, where her daughter Suma works and what she does, sometimes worries that she can't recall things, however not too much.  Feels she is more dependent on  Maynor, however admits meds have helped in this respect as well as not nervous as much when he has to go to work.  Admits she rarely ventures outside of her home alone, only when she has to go to [employer] for groceries and takes her car (store is one block away).  Does not venture out alone in the neighborhood for walks, states neighborhood has changed.  Will sometimes walk with Maynor, and sometimes not.  Denies ever getting lost as she rarely ventures out and no long distances.  When asked questions about childhood, expresses no difficulty in recalling father's job (), and mother's love for cooking [place].  Speaks about mom being president of [employer], always at her school, bake sales to raise funds for activities, etc.    Tammy appears stable, admits that sometimes her heart feels as though it is racing, however, struggles to recall words.  Will see her as scheduled.   Note:  mother with hx of dementia.    Treatment plan:  Target symptoms: recurrent depression, anxiety   Why chosen therapy is appropriate versus another modality: relevant to diagnosis, patient responds to this modality, evidence based practice  Outcome monitoring methods: self-report, observation  Therapeutic  intervention type: insight oriented psychotherapy, supportive psychotherapy    Risk parameters:  Patient reports no suicidal ideation  Patient reports no homicidal ideation  Patient reports no self-injurious behavior  Patient reports no violent behavior    Verbal deficits: None    Patient's response to intervention:  The patient's response to intervention is accepting.    Progress toward goals and other mental status changes:  The patient's progress toward goals is good.    Diagnosis:   1. Moderate episode of recurrent major depressive disorder    2. Generalized anxiety disorder with panic attacks        Plan:  individual psychotherapy    Return to clinic: as scheduled    Length of Service (minutes): 45         [1]   Current Outpatient Medications:     clonazePAM (KLONOPIN) 0.5 MG tablet, Take 1 tablet (0.5 mg total) by mouth daily as needed for Anxiety., Disp: 30 tablet, Rfl: 1    sertraline (ZOLOFT) 100 MG tablet, Take by mouth 100mg tab with 50mg tab (150mg total) every evening., Disp: 30 tablet, Rfl: 1    sertraline (ZOLOFT) 50 MG tablet, Take by mouth 100mg tab with 50mg tab (150mg total) every evening., Disp: 30 tablet, Rfl: 1

## 2025-03-17 PROBLEM — F33.41 RECURRENT MAJOR DEPRESSIVE DISORDER, IN PARTIAL REMISSION: Status: RESOLVED | Noted: 2022-12-20 | Resolved: 2025-03-17

## 2025-04-21 ENCOUNTER — OFFICE VISIT (OUTPATIENT)
Dept: PSYCHIATRY | Facility: CLINIC | Age: 60
End: 2025-04-21
Payer: COMMERCIAL

## 2025-04-21 VITALS
WEIGHT: 179.25 LBS | HEART RATE: 86 BPM | BODY MASS INDEX: 32.99 KG/M2 | HEIGHT: 62 IN | SYSTOLIC BLOOD PRESSURE: 130 MMHG | DIASTOLIC BLOOD PRESSURE: 74 MMHG

## 2025-04-21 DIAGNOSIS — F33.1 MODERATE EPISODE OF RECURRENT MAJOR DEPRESSIVE DISORDER: Primary | ICD-10-CM

## 2025-04-21 DIAGNOSIS — F41.0 GENERALIZED ANXIETY DISORDER WITH PANIC ATTACKS: ICD-10-CM

## 2025-04-21 DIAGNOSIS — R45.89 LONELINESS: ICD-10-CM

## 2025-04-21 DIAGNOSIS — F41.1 GENERALIZED ANXIETY DISORDER WITH PANIC ATTACKS: ICD-10-CM

## 2025-04-21 PROCEDURE — 3075F SYST BP GE 130 - 139MM HG: CPT | Mod: CPTII,S$GLB,, | Performed by: STUDENT IN AN ORGANIZED HEALTH CARE EDUCATION/TRAINING PROGRAM

## 2025-04-21 PROCEDURE — 99999 PR PBB SHADOW E&M-EST. PATIENT-LVL III: CPT | Mod: PBBFAC,,, | Performed by: STUDENT IN AN ORGANIZED HEALTH CARE EDUCATION/TRAINING PROGRAM

## 2025-04-21 PROCEDURE — 99214 OFFICE O/P EST MOD 30 MIN: CPT | Mod: S$GLB,,, | Performed by: STUDENT IN AN ORGANIZED HEALTH CARE EDUCATION/TRAINING PROGRAM

## 2025-04-21 PROCEDURE — 3008F BODY MASS INDEX DOCD: CPT | Mod: CPTII,S$GLB,, | Performed by: STUDENT IN AN ORGANIZED HEALTH CARE EDUCATION/TRAINING PROGRAM

## 2025-04-21 PROCEDURE — 96136 PSYCL/NRPSYC TST PHY/QHP 1ST: CPT | Mod: 59,S$GLB,, | Performed by: STUDENT IN AN ORGANIZED HEALTH CARE EDUCATION/TRAINING PROGRAM

## 2025-04-21 PROCEDURE — 1159F MED LIST DOCD IN RCRD: CPT | Mod: CPTII,S$GLB,, | Performed by: STUDENT IN AN ORGANIZED HEALTH CARE EDUCATION/TRAINING PROGRAM

## 2025-04-21 PROCEDURE — 3078F DIAST BP <80 MM HG: CPT | Mod: CPTII,S$GLB,, | Performed by: STUDENT IN AN ORGANIZED HEALTH CARE EDUCATION/TRAINING PROGRAM

## 2025-04-21 PROCEDURE — G2211 COMPLEX E/M VISIT ADD ON: HCPCS | Mod: S$GLB,,, | Performed by: STUDENT IN AN ORGANIZED HEALTH CARE EDUCATION/TRAINING PROGRAM

## 2025-04-21 PROCEDURE — 1160F RVW MEDS BY RX/DR IN RCRD: CPT | Mod: CPTII,S$GLB,, | Performed by: STUDENT IN AN ORGANIZED HEALTH CARE EDUCATION/TRAINING PROGRAM

## 2025-04-21 RX ORDER — ARIPIPRAZOLE 2 MG/1
2 TABLET ORAL DAILY
Qty: 30 TABLET | Refills: 1 | Status: SHIPPED | OUTPATIENT
Start: 2025-04-21 | End: 2025-06-20

## 2025-04-21 RX ORDER — SERTRALINE HYDROCHLORIDE 100 MG/1
200 TABLET, FILM COATED ORAL DAILY
Qty: 60 TABLET | Refills: 1 | Status: SHIPPED | OUTPATIENT
Start: 2025-04-21 | End: 2025-06-20

## 2025-04-21 RX ORDER — CLONAZEPAM 0.5 MG/1
0.5 TABLET ORAL DAILY PRN
Qty: 30 TABLET | Refills: 1 | Status: SHIPPED | OUTPATIENT
Start: 2025-04-21

## 2025-04-21 NOTE — PATIENT INSTRUCTIONS
Start ABILIFY 2mg daily  Continue other medicine as prescribed   Please keep therapy appointments  Socialize more, get out of the house

## 2025-04-21 NOTE — PROGRESS NOTES
"    Outpatient Psychiatry Followup Visit  4/21/2025  Assessment & Plan    Impression     ICD-10-CM ICD-9-CM   1. Moderate episode of recurrent major depressive disorder  F33.1 296.32   2. Generalized anxiety disorder with panic attacks  F41.1 300.02    F41.0 300.01   3. Loneliness  R45.89 V62.89   4. BMI 32.0-32.9,adult  Z68.32 V85.32      Plan of Care & Medication Management    Chart was reviewed. The risks and benefits of medication were discussed with pt. The treatment plan and followup plan were reviewed with pt. Pt understands to contact clinic if symptoms worsen. Pt understands to call 911 or go to nearest ER for suicidal ideation, intent or plan. Unless otherwise specified, pt did NOT display signs of nor endorse symptoms of overt psychosis or acute mood disorder requiring hospitalization during the encounter; pt denied violent thoughts or suicidal or homicidal ideation, intent, or plan.   RX History KLONOPIN, LEXAPRO ("I guess it worked out"), REXULTI (pt unsure of response), VALIUM, ZOLOFT   Current RX Start ABILIFY  Pt was provided [facility] educational material 4/21/2025  Adjustments:  4/21/2025: Start 2mg daily  Prior to 4/21/2025 pt was taking ZOLOFT 200mg hs  Continue KLONOPIN  10/31/2024 Signed contract, reviewed   Adjustments:  10/31/2024: Continue 0.5mg daily prn anxiety  Prior to 10/31/2024, pt had been taking 0.5mg daily prn anxiety  Continue ZOLOFT   Adjustments:  3/11/2025: increase to 200mg hs   2/10/2025: Increase to 150mg HS  1/13/2025: Increase to 125mg HS  10/31/2024: Continue 100mg HS  Prior to 10/31/2024, pt had been taking 100mg HS      Education, Counseling & Monitoring []SLEEP HYGIENE  []THERAPY  []CONTRACT 4/21/2025?  [] REVIEWED 4/21/2025?  [x]NRT  []   Other Orders Continue individual psychotherapy   RETURN K: STANDARD PROTOCOL: RETURN IN 4 WEEKS (ONE MONTH)       Subjective    Available documentation has been reviewed, and pertinent elements of the chart have been incorporated into " "this note where appropriate. Last [facility] encounter with writer was on 3/11/2025   Tammy Yao, a 60 y.o. female, presenting for followup visit. This visit was done in person, IN CLINIC.      "I'm okay"  Getting out less, and now feeling even more anxious  Apprehension about daughter's wedding    Pt was encouraged to keep therapy appointments    Noticed a little favorable difference from ZOLOFT increase  Using KLONOPIN daily    NO panic attacks in interim    Interrupted sleep  Appetite stable  Concentration intact  Amotivation  Sadness  Worthlessness  NO SI  Moderate depression persists, partially treated    Discussed adding on ABILIFY  Advised to avoid gambling while starting it  Continue treatment otherwise as planned        Objective    Vitals:    04/21/25 0932   BP: 130/74   Pulse: 86   Weight: 81.3 kg (179 lb 3.7 oz)   Height: 5' 2" (1.575 m)     (Current body mass index is 32.78 kg/m².)    MSE/PE  1. Appearance: Dress is informal but appropriate. Motor activity normal.  2. Discourse: Clear speech with normal rate and volume. Associations intact. Orderly.  3. Emotional Expression: Anxious and depressed mood.  4. Perception and Thinking: No hallucinations. No suicidality, no homicidality, delusions, or paranoia.  5. Sensorium: Grossly intact. Able to focus for interview.  6. Memory and Fund of Knowledge: Intact for content of interview.  7. Insight and Judgment: Intact.       Measurement-Based Care (MBC):     Routine Instruments   PROMIS-ANXIETY Interpretation: 12 (4a raw score): T-SCORE 63.4; MODERATE using 55-60-70 cutoffs.   PROMIS-DEPRESSION Interpretation: 11 (4a raw score): T-SCORE 60.5; MODERATE using 55-60-70 cutoffs.   PSS4 Interpretation: 500: Low stress appraisal. 0 PH, 0 LSE.   Additional Instruments   MBC ANCHOR : a little better         Auto-populated chart data omitted from this note for brevity.      Billing Documentation:     Method IN PERSON visit at the clinic, established   E/M 22046: FOLLOW " "UP VISIT, Rx mgmt, "Multiple STABLE chronic illnesses"   Additional 55465, with modifer 59: administered and scored more than one psychological or neuropsychological tests (see MBC above) (16+ mins)  , without modifiers -24,-25,-53: COMPLEXITY: Visit today included increased complexity associated with the care of the episodic problem(s) (multiple psychiatric disorders - see above) addressed and managing the longitudinal care of the patient due to the serious and/or complex managed problem(s) (multiple psychiatric disorders - see above).        Total Time: N/A - Not billing for time        Andrew Swan DO  Department of Psychiatry, [facility]        "

## 2025-04-22 ENCOUNTER — OFFICE VISIT (OUTPATIENT)
Dept: PSYCHIATRY | Facility: CLINIC | Age: 60
End: 2025-04-22
Payer: COMMERCIAL

## 2025-04-22 DIAGNOSIS — F41.0 GENERALIZED ANXIETY DISORDER WITH PANIC ATTACKS: ICD-10-CM

## 2025-04-22 DIAGNOSIS — F33.1 MODERATE EPISODE OF RECURRENT MAJOR DEPRESSIVE DISORDER: Primary | ICD-10-CM

## 2025-04-22 DIAGNOSIS — F41.1 GENERALIZED ANXIETY DISORDER WITH PANIC ATTACKS: ICD-10-CM

## 2025-04-22 PROCEDURE — 90834 PSYTX W PT 45 MINUTES: CPT | Mod: S$GLB,,, | Performed by: SOCIAL WORKER

## 2025-04-22 NOTE — PROGRESS NOTES
Individual Psychotherapy (PhD/LCSW)    4/22/2025    Site:  [facility]        Therapeutic Intervention: Met with patient.  Outpatient - Supportive psychotherapy 45 min - CPT Code 22579 and Outpatient - Interactive psychotherapy 45 min - CPT code 32006    Chief complaint/reason for encounter: depression and anxiety   Medical history:   Past Medical History:   Diagnosis Date    Personal history of colonic polyps 06/05/2023       Medications:  Current Medications[1]    Family history of psychiatric illness:   Family History   Problem Relation Name Age of Onset    Diabetes Mother      Dementia Mother      Cancer Father      Diabetes Maternal Grandmother      Depression Maternal Grandmother         Social history (marriage, employment, etc.): Social History[2]    Interval history and content of current session:  Presents on time and speaks to feeling anxious, wedding coming up, Ewa is leaving their home, has mixed feelings about this, would not stop her from moving on with her life, however it is hard.  Agrees to see what is out there and will consider learning something new.  Will see her as scheduled.  Memory is a challenge.      Treatment plan:  Target symptoms: lack of focus, recurrent depression, anxiety   Why chosen therapy is appropriate versus another modality: relevant to diagnosis, patient responds to this modality, evidence based practice  Outcome monitoring methods: self-report, observation  Therapeutic intervention type: supportive psychotherapy    Risk parameters:  Patient reports no suicidal ideation  Patient reports no homicidal ideation  Patient reports no self-injurious behavior  Patient reports no violent behavior    Verbal deficits: None    Patient's response to intervention:  The patient's response to intervention is accepting.    Progress toward goals and other mental status changes:  The patient's progress toward goals is good.    Diagnosis:   1. Moderate episode of recurrent major depressive  disorder    2. Generalized anxiety disorder with panic attacks        Plan:  individual psychotherapy    Return to clinic: as scheduled    Length of Service (minutes): 45         [1]   Current Outpatient Medications:     ARIPiprazole (ABILIFY) 2 MG Tab, Take 1 tablet (2 mg total) by mouth once daily., Disp: 30 tablet, Rfl: 1    clonazePAM (KLONOPIN) 0.5 MG tablet, Take 1 tablet (0.5 mg total) by mouth daily as needed for Anxiety., Disp: 30 tablet, Rfl: 1    sertraline (ZOLOFT) 100 MG tablet, Take 2 tablets (200 mg total) by mouth once daily., Disp: 60 tablet, Rfl: 1  [2]   Social History  Tobacco Use    Smoking status: Never    Smokeless tobacco: Never   Substance Use Topics    Alcohol use: Yes     Comment: Occ    Drug use: Never

## 2025-04-23 ENCOUNTER — OFFICE VISIT (OUTPATIENT)
Dept: FAMILY MEDICINE | Facility: CLINIC | Age: 60
End: 2025-04-23
Payer: COMMERCIAL

## 2025-04-23 VITALS
HEART RATE: 86 BPM | OXYGEN SATURATION: 98 % | BODY MASS INDEX: 32.74 KG/M2 | DIASTOLIC BLOOD PRESSURE: 66 MMHG | SYSTOLIC BLOOD PRESSURE: 100 MMHG | WEIGHT: 177.94 LBS | HEIGHT: 62 IN

## 2025-04-23 DIAGNOSIS — F41.1 GENERALIZED ANXIETY DISORDER WITH PANIC ATTACKS: Primary | ICD-10-CM

## 2025-04-23 DIAGNOSIS — F41.0 GENERALIZED ANXIETY DISORDER WITH PANIC ATTACKS: Primary | ICD-10-CM

## 2025-04-23 PROCEDURE — 3008F BODY MASS INDEX DOCD: CPT | Mod: CPTII,S$GLB,, | Performed by: FAMILY MEDICINE

## 2025-04-23 PROCEDURE — 3074F SYST BP LT 130 MM HG: CPT | Mod: CPTII,S$GLB,, | Performed by: FAMILY MEDICINE

## 2025-04-23 PROCEDURE — 99999 PR PBB SHADOW E&M-EST. PATIENT-LVL III: CPT | Mod: PBBFAC,,, | Performed by: FAMILY MEDICINE

## 2025-04-23 PROCEDURE — 99213 OFFICE O/P EST LOW 20 MIN: CPT | Mod: S$GLB,,, | Performed by: FAMILY MEDICINE

## 2025-04-23 PROCEDURE — 1159F MED LIST DOCD IN RCRD: CPT | Mod: CPTII,S$GLB,, | Performed by: FAMILY MEDICINE

## 2025-04-23 PROCEDURE — 3078F DIAST BP <80 MM HG: CPT | Mod: CPTII,S$GLB,, | Performed by: FAMILY MEDICINE

## 2025-04-23 NOTE — PROGRESS NOTES
SUBJECTIVE:    Patient ID: Tammy Yao is a 60 y.o. female.    Chief Complaint: Anxiety  61 yo female here today to follow up on her anxiety, pt at last visit had follow up with psychiatry and counseling,  She is currently on zoloft 100mg,Ambilify 2mg and clonazapam, she appears more comfortable today, she is more talkative, and seems to have improving mood.  agrees that she I sdoing better.     I reviewed her overdue health maintenance patient is due for mammogram, influenza vaccine, COVID-19 vaccine, tetanus vaccine, hemoglobin A1c screening, shingles vaccine.     Significant Past medical history  Depression:  Clonazepam 0.5 mg b.i.d. (Zoloft discontinued)  Insomnia:         Specialists  Gyn : Dr JERRY Paredes  GI: Dr Heredia        Smoke:None  ETOH: None  Exercise: None     Anxiety  Presents for follow-up visit. Symptoms include confusion, excessive worry and nervous/anxious behavior. Patient reports no chest pain, dysphagia or palpitations. Symptoms occur most days. The severity of symptoms is moderate. The quality of sleep is good.     Compliance with medications is %.         Past Medical History:   Diagnosis Date    Personal history of colonic polyps 06/05/2023     Social History[1]  History reviewed. No pertinent surgical history.  Family History   Problem Relation Name Age of Onset    Diabetes Mother      Dementia Mother      Cancer Father      Diabetes Maternal Grandmother      Depression Maternal Grandmother       Current Medications[2]  Review of patient's allergies indicates:  No Known Allergies    Review of Systems   Constitutional:  Negative for activity change and unexpected weight change.   HENT:  Negative for hearing loss, rhinorrhea and trouble swallowing.    Eyes:  Negative for discharge and visual disturbance.   Respiratory:  Negative for chest tightness and wheezing.    Cardiovascular:  Negative for chest pain and palpitations.   Gastrointestinal:  Negative for blood in stool,  "constipation, diarrhea and vomiting.   Endocrine: Negative for polydipsia and polyuria.   Genitourinary:  Negative for difficulty urinating, dysuria, hematuria and menstrual problem.   Musculoskeletal:  Negative for arthralgias, joint swelling and neck pain.   Neurological:  Negative for weakness and headaches.   Psychiatric/Behavioral:  Positive for confusion. Negative for dysphoric mood. The patient is nervous/anxious.           Blood pressure 100/66, pulse 86, height 5' 2" (1.575 m), weight 80.7 kg (177 lb 14.6 oz), SpO2 98%. Body mass index is 32.54 kg/m².   Objective:      Physical Exam  Vitals reviewed.   Constitutional:       General: She is not in acute distress.     Appearance: Normal appearance. She is obese. She is not toxic-appearing.   HENT:      Head: Normocephalic and atraumatic.      Right Ear: Tympanic membrane normal.      Left Ear: Tympanic membrane normal.   Cardiovascular:      Rate and Rhythm: Normal rate and regular rhythm.      Heart sounds: Murmur heard.   Pulmonary:      Effort: No respiratory distress.      Breath sounds: Normal breath sounds. No wheezing or rhonchi.   Skin:     General: Skin is warm and dry.      Capillary Refill: Capillary refill takes less than 2 seconds.   Neurological:      Mental Status: She is alert and oriented to person, place, and time.         Assessment:       1. Generalized anxiety disorder with panic attacks         Plan:           Generalized anxiety disorder with panic attacks    Pt appears to be doing better, not changes in her medications. I have recommended that she begin a regular exercise program, get outside daily, start going back to [employer], develop any type of hobby.                            [1]   Social History  Socioeconomic History    Marital status:    Tobacco Use    Smoking status: Never    Smokeless tobacco: Never   Substance and Sexual Activity    Alcohol use: Yes     Comment: Occ    Drug use: Never    Sexual activity: Yes     " Partners: Male     Social Drivers of Health     Financial Resource Strain: Low Risk  (4/17/2025)    Overall Financial Resource Strain (CARDIA)     Difficulty of Paying Living Expenses: Not hard at all   Food Insecurity: No Food Insecurity (4/17/2025)    Hunger Vital Sign     Worried About Running Out of Food in the Last Year: Never true     Ran Out of Food in the Last Year: Never true   Transportation Needs: No Transportation Needs (4/17/2025)    PRAPARE - Transportation     Lack of Transportation (Medical): No     Lack of Transportation (Non-Medical): No   Physical Activity: Inactive (4/17/2025)    Exercise Vital Sign     Days of Exercise per Week: 0 days     Minutes of Exercise per Session: 20 min   Stress: Stress Concern Present (4/17/2025)    [employer] - Occupational Stress Questionnaire     Feeling of Stress : To some extent   Housing Stability: Low Risk  (4/17/2025)    Housing Stability Vital Sign     Unable to Pay for Housing in the Last Year: No     Number of Times Moved in the Last Year: 0     Homeless in the Last Year: No   [2]   Current Outpatient Medications   Medication Sig Dispense Refill    ARIPiprazole (ABILIFY) 2 MG Tab Take 1 tablet (2 mg total) by mouth once daily. 30 tablet 1    clonazePAM (KLONOPIN) 0.5 MG tablet Take 1 tablet (0.5 mg total) by mouth daily as needed for Anxiety. 30 tablet 1    sertraline (ZOLOFT) 100 MG tablet Take 2 tablets (200 mg total) by mouth once daily. 60 tablet 1     No current facility-administered medications for this visit.

## 2025-05-20 ENCOUNTER — LAB VISIT (OUTPATIENT)
Dept: LAB | Facility: HOSPITAL | Age: 60
End: 2025-05-20
Attending: NURSE PRACTITIONER
Payer: COMMERCIAL

## 2025-05-20 ENCOUNTER — OFFICE VISIT (OUTPATIENT)
Dept: NEUROLOGY | Facility: CLINIC | Age: 60
End: 2025-05-20
Payer: COMMERCIAL

## 2025-05-20 VITALS
SYSTOLIC BLOOD PRESSURE: 103 MMHG | HEART RATE: 83 BPM | BODY MASS INDEX: 34.46 KG/M2 | HEIGHT: 62 IN | RESPIRATION RATE: 16 BRPM | DIASTOLIC BLOOD PRESSURE: 73 MMHG | WEIGHT: 187.25 LBS

## 2025-05-20 DIAGNOSIS — G31.84 MCI (MILD COGNITIVE IMPAIRMENT): ICD-10-CM

## 2025-05-20 DIAGNOSIS — E53.8 B12 DEFICIENCY: Primary | ICD-10-CM

## 2025-05-20 DIAGNOSIS — F41.0 GENERALIZED ANXIETY DISORDER WITH PANIC ATTACKS: ICD-10-CM

## 2025-05-20 DIAGNOSIS — F41.1 GENERALIZED ANXIETY DISORDER WITH PANIC ATTACKS: ICD-10-CM

## 2025-05-20 DIAGNOSIS — F33.1 MODERATE EPISODE OF RECURRENT MAJOR DEPRESSIVE DISORDER: ICD-10-CM

## 2025-05-20 DIAGNOSIS — E53.8 B12 DEFICIENCY: ICD-10-CM

## 2025-05-20 LAB — VIT B12 SERPL-MCNC: 829 PG/ML (ref 210–950)

## 2025-05-20 PROCEDURE — 99215 OFFICE O/P EST HI 40 MIN: CPT | Mod: S$GLB,,, | Performed by: NURSE PRACTITIONER

## 2025-05-20 PROCEDURE — 3008F BODY MASS INDEX DOCD: CPT | Mod: CPTII,S$GLB,, | Performed by: NURSE PRACTITIONER

## 2025-05-20 PROCEDURE — 83921 ORGANIC ACID SINGLE QUANT: CPT

## 2025-05-20 PROCEDURE — 3074F SYST BP LT 130 MM HG: CPT | Mod: CPTII,S$GLB,, | Performed by: NURSE PRACTITIONER

## 2025-05-20 PROCEDURE — 82607 VITAMIN B-12: CPT

## 2025-05-20 PROCEDURE — 36415 COLL VENOUS BLD VENIPUNCTURE: CPT | Mod: PO

## 2025-05-20 PROCEDURE — 1159F MED LIST DOCD IN RCRD: CPT | Mod: CPTII,S$GLB,, | Performed by: NURSE PRACTITIONER

## 2025-05-20 PROCEDURE — 99999 PR PBB SHADOW E&M-EST. PATIENT-LVL III: CPT | Mod: PBBFAC,,, | Performed by: NURSE PRACTITIONER

## 2025-05-20 PROCEDURE — 3078F DIAST BP <80 MM HG: CPT | Mod: CPTII,S$GLB,, | Performed by: NURSE PRACTITIONER

## 2025-05-20 RX ORDER — LANOLIN ALCOHOL/MO/W.PET/CERES
1000 CREAM (GRAM) TOPICAL DAILY
COMMUNITY

## 2025-05-20 NOTE — Clinical Note
Hello. This is a potential lecanemab pt -- what is the status of referrals to Dr. Hernandez? Are y'all referring elsewhere like [facility] ?

## 2025-05-20 NOTE — PATIENT INSTRUCTIONS
Consider your interest in more invasive testing such as a spinal tap as per our discussion   Consider interest in seeing a subspeciliast -- [facility] vs [facility] based on availability     Will update B12 level now to ensure improved with oral supplement    Will work to expedite the more in depth cognitive testing     Please call our clinic at [phone] or send a message on the [employer] portal if there are any changes to the plan discussed today. For example, if you are not contacted for the requested tests, referral(s) within one week, if you are unable to receive the medications prescribed, or if you feel you need to change the treatment course for any reason.     Follow up after the cognitive testing is complete or sooner if you have new / worsening concerns
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
## 2025-05-20 NOTE — ASSESSMENT & PLAN NOTE
Some improvement with recent changes in psych meds, but pt still has obvious ST memory loss and is not at baseline with regards to LOF. Clinical presentation / serological biomarker testing raises concern for emerging ND disease, namely AD. Reviewed imaging, other serologies -- update B12 to ensure improvement with oral supplement.   Discussed next steps -- referral to sub specialist to discuss possible IV anti amyloid tx / more invasive testing like amyloid PET or LP. Also discussed oral Rx like AChEi.   They have a lot to discuss. Would like to proceed with NP testing and will let me know next steps after discussing with family. I will discuss with our team if they are accepting new [facility] referrals vs referring her to [facility].

## 2025-05-21 ENCOUNTER — OFFICE VISIT (OUTPATIENT)
Dept: PSYCHIATRY | Facility: CLINIC | Age: 60
End: 2025-05-21
Payer: COMMERCIAL

## 2025-05-21 VITALS
DIASTOLIC BLOOD PRESSURE: 66 MMHG | SYSTOLIC BLOOD PRESSURE: 115 MMHG | HEART RATE: 79 BPM | HEIGHT: 62 IN | WEIGHT: 184.44 LBS | BODY MASS INDEX: 33.94 KG/M2

## 2025-05-21 DIAGNOSIS — Z79.899 LONG TERM CURRENT USE OF ANTIPSYCHOTIC MEDICATION: ICD-10-CM

## 2025-05-21 DIAGNOSIS — F41.0 GENERALIZED ANXIETY DISORDER WITH PANIC ATTACKS: ICD-10-CM

## 2025-05-21 DIAGNOSIS — Z13.220 LIPID SCREENING: ICD-10-CM

## 2025-05-21 DIAGNOSIS — R45.89 LONELINESS: ICD-10-CM

## 2025-05-21 DIAGNOSIS — F33.1 MODERATE EPISODE OF RECURRENT MAJOR DEPRESSIVE DISORDER: Primary | ICD-10-CM

## 2025-05-21 DIAGNOSIS — F41.1 GENERALIZED ANXIETY DISORDER WITH PANIC ATTACKS: ICD-10-CM

## 2025-05-21 DIAGNOSIS — F33.0 MILD EPISODE OF RECURRENT MAJOR DEPRESSIVE DISORDER: Primary | ICD-10-CM

## 2025-05-21 PROCEDURE — 3074F SYST BP LT 130 MM HG: CPT | Mod: CPTII,S$GLB,, | Performed by: STUDENT IN AN ORGANIZED HEALTH CARE EDUCATION/TRAINING PROGRAM

## 2025-05-21 PROCEDURE — 99999 PR PBB SHADOW E&M-EST. PATIENT-LVL I: CPT | Mod: PBBFAC,,, | Performed by: SOCIAL WORKER

## 2025-05-21 PROCEDURE — 3078F DIAST BP <80 MM HG: CPT | Mod: CPTII,S$GLB,, | Performed by: STUDENT IN AN ORGANIZED HEALTH CARE EDUCATION/TRAINING PROGRAM

## 2025-05-21 PROCEDURE — G2211 COMPLEX E/M VISIT ADD ON: HCPCS | Mod: S$GLB,,, | Performed by: STUDENT IN AN ORGANIZED HEALTH CARE EDUCATION/TRAINING PROGRAM

## 2025-05-21 PROCEDURE — 1159F MED LIST DOCD IN RCRD: CPT | Mod: CPTII,S$GLB,, | Performed by: STUDENT IN AN ORGANIZED HEALTH CARE EDUCATION/TRAINING PROGRAM

## 2025-05-21 PROCEDURE — 1160F RVW MEDS BY RX/DR IN RCRD: CPT | Mod: CPTII,S$GLB,, | Performed by: STUDENT IN AN ORGANIZED HEALTH CARE EDUCATION/TRAINING PROGRAM

## 2025-05-21 PROCEDURE — 99214 OFFICE O/P EST MOD 30 MIN: CPT | Mod: S$GLB,,, | Performed by: STUDENT IN AN ORGANIZED HEALTH CARE EDUCATION/TRAINING PROGRAM

## 2025-05-21 PROCEDURE — 3008F BODY MASS INDEX DOCD: CPT | Mod: CPTII,S$GLB,, | Performed by: STUDENT IN AN ORGANIZED HEALTH CARE EDUCATION/TRAINING PROGRAM

## 2025-05-21 PROCEDURE — 96136 PSYCL/NRPSYC TST PHY/QHP 1ST: CPT | Mod: 59,S$GLB,, | Performed by: STUDENT IN AN ORGANIZED HEALTH CARE EDUCATION/TRAINING PROGRAM

## 2025-05-21 PROCEDURE — 99999 PR PBB SHADOW E&M-EST. PATIENT-LVL III: CPT | Mod: PBBFAC,,, | Performed by: STUDENT IN AN ORGANIZED HEALTH CARE EDUCATION/TRAINING PROGRAM

## 2025-05-21 PROCEDURE — 90834 PSYTX W PT 45 MINUTES: CPT | Mod: S$GLB,,, | Performed by: SOCIAL WORKER

## 2025-05-21 RX ORDER — ARIPIPRAZOLE 2 MG/1
2 TABLET ORAL DAILY
Qty: 30 TABLET | Refills: 2 | Status: SHIPPED | OUTPATIENT
Start: 2025-05-21 | End: 2025-08-19

## 2025-05-21 RX ORDER — SERTRALINE HYDROCHLORIDE 100 MG/1
200 TABLET, FILM COATED ORAL DAILY
Qty: 60 TABLET | Refills: 2 | Status: SHIPPED | OUTPATIENT
Start: 2025-05-21 | End: 2025-08-19

## 2025-05-21 RX ORDER — CLONAZEPAM 0.5 MG/1
0.5 TABLET ORAL DAILY PRN
Qty: 30 TABLET | Refills: 1 | Status: SHIPPED | OUTPATIENT
Start: 2025-05-21

## 2025-05-21 NOTE — PROGRESS NOTES
"Individual Psychotherapy (PhD/LCSW)    2025    Site:  [place]        Therapeutic Intervention: Met with patient.  Outpatient - Supportive psychotherapy 45 min - CPT Code 01628 and Outpatient - Interactive psychotherapy 45 min - CPT code 75983    Chief complaint/reason for encounter: depression and anxiety     Medical history:   Past Medical History:   Diagnosis Date    Personal history of colonic polyps 2023       Medications:  Current Medications[1]    Family history of psychiatric illness:   Family History   Problem Relation Name Age of Onset    Diabetes Mother      Dementia Mother      Cancer Father      Diabetes Maternal Grandmother      Depression Maternal Grandmother         Social history (marriage, employment, etc.):  Initial on 2024: Tammy presents on time for today's initial. Her  Maynor accompanies her. She would like Maynor present however I ask to meet with her alone. At first she is upset and resistant, however  encourages her to see clinician. I note that she is periodically shaking, grabbing her legs and arms, appears distressed, speaks to being nervous, feels better when she is able to stand and explain that standing is okay. She is 59 year old  female,  to  since . She is unsure of his profession. 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 They have two daughters: Rosie who is 32 and Ewa who is 25. Ewa resides with them and she works as a "little teacher". They have one 5 year old grandson named Yoni (Rosie's son). She was raised in [place] on [facility] [address] [facility]. Likes to cook Italian food and says her meatballs and spaghetti are very good. She is one of two children: Sridevi is her older sister. She admits that Sridevi can be "bossy". Both parents are . Father was a  and predeceased her mother. Believes mother suffered from " "depression. Describes childhood as happy and denies hx of childhood abuse of any kind. Denies any self-harming activities, attempted suicides, psych hospitalizations, street drugs or alcohol abuse and repeatedly states "I don't know" when ask why she is anxious, and what is her thinking, thoughts that are driving the anxiety. Does admit that she fears "being alone" and wants someone to be with her. Admits she is fearful of being in her home when  is away (he is away one week, returns home for one week, and then is off again). Engaged Tammy in DDB and does well. Gave her a Breath stone. Briefly met with her  Maynor (62) and explain when I want to see her again and what her homework is. He appears kind and amenable to helping. Will put in referral to Dr. Swan for assessment and med mgmt. Tammy agrees to engage in therapy and have scheduled her for a follow up prior to her exiting the clinic. Will see her as scheduled.      From Dr. Swan's note on 5/21/2025:    Less rumination  NO panic attacks in interim  Anxiety better controlled     Seems to be adherent to pharmacotherapy  Taking KLONOPIN daily     Mood is stable, "okay"  Less depressed  Getting out more  Less anxiety when alone at home    Interval history and content of current session:  Tammy presents today with euthymic mood, no anxious mood noted, speaks to upcoming daughter's wedding and although she is happy for Ewa and looking forward to the wedding, she will be sorry to have Ewa move out of their home.  Family is well, memory continues to be a concern, however is not stressing over it.  Note: has been diagnosed with MCI.  Will see Tammy in two weeks.  Supportive psychotherapy provided.    Treatment plan:  Target symptoms: recurrent depression, anxiety   Why chosen therapy is appropriate versus another modality: relevant to diagnosis, patient responds to this modality, evidence based practice  Outcome monitoring methods: self-report, " observation  Therapeutic intervention type: supportive psychotherapy    Risk parameters:  Patient reports no suicidal ideation  Patient reports no homicidal ideation  Patient reports no self-injurious behavior  Patient reports no violent behavior    Verbal deficits: None    Patient's response to intervention:  The patient's response to intervention is accepting.    Progress toward goals and other mental status changes:  The patient's progress toward goals is fair .    Diagnosis:   1. Moderate episode of recurrent major depressive disorder    2. Generalized anxiety disorder with panic attacks        Plan:  individual psychotherapy    Return to clinic: as scheduled    Length of Service (minutes): 45         [1]   Current Outpatient Medications:     ARIPiprazole (ABILIFY) 2 MG Tab, Take 1 tablet (2 mg total) by mouth once daily., Disp: 30 tablet, Rfl: 1    clonazePAM (KLONOPIN) 0.5 MG tablet, Take 1 tablet (0.5 mg total) by mouth daily as needed for Anxiety., Disp: 30 tablet, Rfl: 1    cyanocobalamin (VITAMIN B-12) 1000 MCG tablet, Take 1,000 mcg by mouth once daily., Disp: , Rfl:     sertraline (ZOLOFT) 100 MG tablet, Take 2 tablets (200 mg total) by mouth once daily., Disp: 60 tablet, Rfl: 1

## 2025-05-21 NOTE — PATIENT INSTRUCTIONS
Please complete lab work at your earliest convenience. Your labs are FASTING, so no food or Calories for 8-12 hours prior to the blood draw.     Continue medicine as prescribed

## 2025-05-21 NOTE — PROGRESS NOTES
"    Outpatient Psychiatry Followup Visit  5/21/2025  Assessment & Plan    Impression     ICD-10-CM ICD-9-CM   1. Mild episode of recurrent major depressive disorder  F33.0 296.31   2. Generalized anxiety disorder with panic attacks  F41.1 300.02    F41.0 300.01   3. Loneliness  R45.89 V62.89   4. Long term current use of antipsychotic medication  Z79.899 V58.69   5. Lipid screening  Z13.220 V77.91   6. BMI 33.0-33.9,adult  Z68.33 V85.33      Plan of Care & Medication Management    Chart was reviewed. The risks and benefits of medication were discussed with pt. The treatment plan and followup plan were reviewed with pt. Pt understands to contact clinic if symptoms worsen. Pt understands to call 911 or go to nearest ER for suicidal ideation, intent or plan. Unless otherwise specified, pt did NOT display signs of nor endorse symptoms of overt psychosis or acute mood disorder requiring hospitalization during the encounter; pt denied violent thoughts or suicidal or homicidal ideation, intent, or plan.   RX History KLONOPIN, LEXAPRO ("I guess it worked out"), REXULTI (pt unsure of response), VALIUM, ZOLOFT   Current RX Continue ABILIFY  Pt was provided [facility] educational material 4/21/2025  Metabolic monitoring:  MAY2025 BMI 33.7  APR2025 BMI 32.8  Adjustments:  4/21/2025: Start 2mg daily  Prior to 4/21/2025 pt was taking ZOLOFT 200mg hs  Continue KLONOPIN  10/31/2024 Signed contract, reviewed   Adjustments:  10/31/2024: Continue 0.5mg daily prn anxiety  Prior to 10/31/2024, pt had been taking 0.5mg daily prn anxiety  Continue ZOLOFT   Adjustments:  3/11/2025: increase to 200mg hs   2/10/2025: Increase to 150mg HS  1/13/2025: Increase to 125mg HS  10/31/2024: Continue 100mg HS  Prior to 10/31/2024, pt had been taking 100mg HS      Other []CONTRACT 5/21/2025?  [x] REVIEWED 5/21/2025?  [x]CMP (liver function testing with blood chemistry)  HgbA1c and LIPID PROFILE (metabolic risk assessment and monitoring)   RETURN R: " "STANDARD PROTOCOL: RETURN IN 8 WEEKS (TWO MONTHS)       Subjective    Available documentation has been reviewed, and pertinent elements of the chart have been incorporated into this note where appropriate. Last Epic encounter with writer was on 4/21/2025   Tammy Yao, a 60 y.o. female, presenting for followup visit. This visit was done in person, IN CLINIC.      Some apprehension about daughter's move and wedding is improving  Reports doing better overall  Started ABILIFY, "I think that medication is helping me," likes it    Less rumination  NO panic attacks in interim  Anxiety better controlled    Seems to be adherent to pharmacotherapy  Taking KLONOPIN daily    Mood is stable, "okay"  Less depressed  Getting out more  Less anxiety when alone at home    Pt was encouraged to keep therapy appointments     Check lipids and A1c  Reduce visit frequency to q2m  Continue treatment otherwise as planned        Objective    Vitals:    05/21/25 0959   BP: 115/66   Pulse: 79   Weight: 83.7 kg (184 lb 6.6 oz)   Height: 5' 2" (1.575 m)     (Current body mass index is 33.73 kg/m².)    MSE/PE  1. Appearance: Dress is informal but appropriate. Motor activity normal.  2. Discourse: Clear speech with normal rate and volume. Associations intact. Orderly.  3. Emotional Expression: Somewhat anxious and depressed mood.  4. Perception and Thinking: No hallucinations. No suicidality, no homicidality, delusions, or paranoia.  5. Sensorium: Grossly intact. Able to focus for interview.  6. Memory and Fund of Knowledge: Intact for content of interview.  7. Insight and Judgment: Intact.       Measurement-Based Care (MBC):     Routine Instruments   PROMIS-ANXIETY Interpretation: 11 (4a raw score): T-SCORE 61.4; MODERATE using 55-60-70 cutoffs.   PROMIS-DEPRESSION Interpretation: 9 (4a raw score): T-SCORE 57.3; MILD using 55-60-70 cutoffs.   PSS4 Interpretation: 700: Stress appraisal is MODERATE. 0 PH, 0 LSE.   Additional Instruments   MBC " "ANCHOR : a little better         Auto-populated chart data omitted from this note for brevity.      Billing Documentation:     Method IN PERSON visit at the clinic, established   E/M 42376: FOLLOW UP VISIT, Rx mgmt, "Multiple STABLE chronic illnesses"   Additional 95784, with modifer 59: administered and scored more than one psychological or neuropsychological tests (see MBC above) (16+ mins)  , without modifiers -24,-25,-53: COMPLEXITY: Visit today included increased complexity associated with the care of the episodic problem(s) (multiple psychiatric disorders - see above) addressed and managing the longitudinal care of the patient due to the serious and/or complex managed problem(s) (multiple psychiatric disorders - see above).                Andrew Swan, DO  Department of Psychiatry, [facility]        "

## 2025-05-23 ENCOUNTER — PATIENT OUTREACH (OUTPATIENT)
Dept: ADMINISTRATIVE | Facility: HOSPITAL | Age: 60
End: 2025-05-23
Payer: COMMERCIAL

## 2025-05-24 LAB — W METHYLMALONIC ACID: 0.28 UMOL/L

## 2025-05-27 ENCOUNTER — RESULTS FOLLOW-UP (OUTPATIENT)
Dept: NEUROLOGY | Facility: CLINIC | Age: 60
End: 2025-05-27

## 2025-05-29 ENCOUNTER — LAB VISIT (OUTPATIENT)
Dept: LAB | Facility: HOSPITAL | Age: 60
End: 2025-05-29
Attending: STUDENT IN AN ORGANIZED HEALTH CARE EDUCATION/TRAINING PROGRAM
Payer: COMMERCIAL

## 2025-05-29 ENCOUNTER — PATIENT MESSAGE (OUTPATIENT)
Dept: PSYCHIATRY | Facility: CLINIC | Age: 60
End: 2025-05-29
Payer: COMMERCIAL

## 2025-05-29 DIAGNOSIS — Z13.220 LIPID SCREENING: ICD-10-CM

## 2025-05-29 DIAGNOSIS — Z79.899 LONG TERM CURRENT USE OF ANTIPSYCHOTIC MEDICATION: ICD-10-CM

## 2025-05-29 LAB
ALBUMIN SERPL-MCNC: 4.1 G/DL (ref 3.5–5.2)
ALP SERPL-CCNC: 125 UNIT/L (ref 55–135)
ALT SERPL-CCNC: 46 UNIT/L (ref 10–44)
ANION GAP (SMH): 8 MMOL/L (ref 8–16)
AST SERPL-CCNC: 30 UNIT/L (ref 10–40)
BILIRUB SERPL-MCNC: 0.5 MG/DL (ref 0.1–1)
BUN SERPL-MCNC: 12 MG/DL (ref 8–23)
CALCIUM SERPL-MCNC: 9.3 MG/DL (ref 8.7–10.5)
CHLORIDE SERPL-SCNC: 105 MMOL/L (ref 95–110)
CHOLEST SERPL-MCNC: 311 MG/DL (ref 120–199)
CHOLEST/HDLC SERPL: 5.4 {RATIO} (ref 2–5)
CO2 SERPL-SCNC: 25 MMOL/L (ref 23–29)
CREAT SERPL-MCNC: 0.8 MG/DL (ref 0.5–1.4)
EAG (SMH): 111 MG/DL (ref 68–131)
GFR SERPLBLD CREATININE-BSD FMLA CKD-EPI: >60 ML/MIN/1.73/M2
GLUCOSE SERPL-MCNC: 91 MG/DL (ref 70–110)
HBA1C MFR BLD: 5.5 % (ref 4.5–6.2)
HDLC SERPL-MCNC: 58 MG/DL (ref 40–75)
HDLC SERPL: 18.6 % (ref 20–50)
LDLC SERPL CALC-MCNC: 233.6 MG/DL (ref 63–159)
NONHDLC SERPL-MCNC: 253 MG/DL
POTASSIUM SERPL-SCNC: 4.6 MMOL/L (ref 3.5–5.1)
PROT SERPL-MCNC: 7.3 GM/DL (ref 6–8.4)
SODIUM SERPL-SCNC: 138 MMOL/L (ref 136–145)
TRIGL SERPL-MCNC: 97 MG/DL (ref 30–150)

## 2025-05-29 PROCEDURE — 83036 HEMOGLOBIN GLYCOSYLATED A1C: CPT

## 2025-05-29 PROCEDURE — 36415 COLL VENOUS BLD VENIPUNCTURE: CPT

## 2025-05-29 PROCEDURE — 84460 ALANINE AMINO (ALT) (SGPT): CPT

## 2025-05-29 PROCEDURE — 83718 ASSAY OF LIPOPROTEIN: CPT

## 2025-05-29 NOTE — TELEPHONE ENCOUNTER
"    Interim Note    5/29/2025    Source:     Family by phone, 9 mins     Content:     Condition change.    reports pt has improved significantly since starting ABILIFY  Pt is less "fearful" when alone  Pt seems less depressed  "It's like a night and day difference"  Reports will be proceeding with neurology testing  Has concerns about persistent short term memory deficits  Wonders if KLONOPIN (started prior to establishing care) contributes to memory deficits     Response:       Explained that pt's improvement with ABILIFY is a good sign  Encouraged to proceed with neurological testing    Encouraged  to join pt in next appointment, if pt allows  At upcoming appointment will discuss options for reducing KLONOPIN  May add Dr. Brown to treatment team       Billing Documentation:     Will not bill.       Andrew Swan,   Department of Psychiatry, [facility]        "

## 2025-06-05 ENCOUNTER — TELEPHONE (OUTPATIENT)
Facility: CLINIC | Age: 60
End: 2025-06-05
Payer: COMMERCIAL

## 2025-06-26 ENCOUNTER — OFFICE VISIT (OUTPATIENT)
Facility: CLINIC | Age: 60
End: 2025-06-26
Payer: COMMERCIAL

## 2025-06-26 DIAGNOSIS — R41.3 MEMORY LOSS: ICD-10-CM

## 2025-06-26 DIAGNOSIS — E53.8 B12 DEFICIENCY: Primary | ICD-10-CM

## 2025-06-26 DIAGNOSIS — F41.0 GENERALIZED ANXIETY DISORDER WITH PANIC ATTACKS: ICD-10-CM

## 2025-06-26 DIAGNOSIS — G47.00 INSOMNIA, UNSPECIFIED TYPE: ICD-10-CM

## 2025-06-26 DIAGNOSIS — F41.1 GENERALIZED ANXIETY DISORDER WITH PANIC ATTACKS: ICD-10-CM

## 2025-06-26 PROCEDURE — 96116 NUBHVL XM PHYS/QHP 1ST HR: CPT | Mod: 95,XE,, | Performed by: NURSE PRACTITIONER

## 2025-06-26 PROCEDURE — 1160F RVW MEDS BY RX/DR IN RCRD: CPT | Mod: CPTII,95,, | Performed by: NURSE PRACTITIONER

## 2025-06-26 PROCEDURE — 98007 SYNCH AUDIO-VIDEO EST HI 40: CPT | Mod: 95,,, | Performed by: NURSE PRACTITIONER

## 2025-06-26 PROCEDURE — 1159F MED LIST DOCD IN RCRD: CPT | Mod: CPTII,95,, | Performed by: NURSE PRACTITIONER

## 2025-06-26 PROCEDURE — 3044F HG A1C LEVEL LT 7.0%: CPT | Mod: CPTII,95,, | Performed by: NURSE PRACTITIONER

## 2025-06-26 NOTE — PROGRESS NOTES
..[facility]  [facility]     PATIENT: Tammy Yao  VISIT DATE: 2025  MRN: [id number]  PRIMARY PROVIDER: Wilbert Newton MD  : 1965      Chaz Appointment:    Recent Clinical History:    Chief Complaint: Memory Changes/Decline.    Clinical Interim: A 60-year-old right-handed female presented with her , Maynor, due to concerns about her short-term memory loss. This issue is characterized by her repeated questioning and difficulty maintaining conversations. She also has a family history of Alzheimer's Disease (AD). She was referred by Nurse Practitioner RUSTAM Pena due to an elevated P-tau 181 level of 1.71 and for consideration of anti-amyloid infusion therapy. Her last MOCA was . patient and her  understand that diagnostic testing, such as cerebrospinal fluid (CSF) analysis, is necessary to confirm the diagnosis and determine her eligibility for infusion therapy. They have expressed concerns about the cost and insurance coverage. I have informed them about potential issues with certain health insurance providers, such as Blue Cross Blue Shield, and have made them aware of the [facility] patient assistance program.    Medication Evaluation: A comprehensive review and reconciliation of the patient's current medications were performed. Medications were assessed for potential cognitive effects, interactions, and appropriateness. discussed LTM use of benzodiazepines and cognitive changes.  Safety Evaluation: The patient's safety was evaluated, including an assessment of the home environment, risk of falls, and other potential hazards. Motor vehicle operation was also assessed, and no safety concerns were identified at this time. No immediate recommendations or modifications were deemed necessary.  Caregiver Evaluation: The patient's primary caregiver was identified, and their knowledge, needs, and ability to provide care were evaluated. The caregiver  "denies needing assistance at this time but was provided with education and support resources for future reference, including requisite referrals as needed.  Advanced Care: The patient is currently documented as Full Code, meaning they have expressed a preference for all possible life-sustaining treatments to be administered in the event of a medical emergency, such as cardiopulmonary resuscitation (CPR), intubation, and mechanical ventilation. Further discussion regarding advance care planning was not conducted at this time.          Relevant History of Baseline Neurocognitive Phenotype:    Developmental Milestones: The patient/family report no known birth complications or early life problems. The patient met all developmental milestones.  Learning Neurodivergence: The patient/family report no signs or symptoms suggestive of developmental learning disorder.  Educational History: HS.  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  Vocational Neurocompatibility: [employer] Career: 5 - [employer]  Retired: 2005      Relevant Neurotrauma History:    History of Traumatic Brain Injury: No History of Traumatic Brain Injury or Concussions  History of Brain/Spine Surgery: No History of Iatrogenic Brain Injury or CNS surgery  History of Toxin Exposure/Substance Abuse: No History of Toxin Exposure or Clinically Relevant Substance Abuse  History of Malnutrition/Vitamin Deficiency: No History of Malnutrition or Clinically Relevant Vitamin Deficiency  History of Chronic Mood Disorder/Stress: anxiety youngest daughter is getting  and she is feeling "empty nest syndrome"  History of Chronic Inflammatory State: No History of CNS inflammation or Clinically Relevant Chronic Inflammatory State      Relevant Family History:    Relevant General History:  Mother- 90 yo DM  Father- yo CA? and a stroke " "(unsure of age he passed away and type of CA- will try contacting sister to find out)  daughter- Rosie born 35 yo - "healthy"  daughter- Ewa 25 yo " healthy "  Relevant Neurocognitive Disorder History:  mother- dementia s/s-81 ( passed at 88 yo)  Relevant Movement Disorder History:  maternal uncle ( ) age of s/s and passing unknown.  Relevant Motor Neuron Disease History:  The patient/family denies a history of ALS, MND, PLS.  Relevant Developmental/Neurodivergent History:  The patient/family denies a history of Dyslexia, ADHD, ASD.  Relevant Psychiatric History:  The patient/family denies a history of MDD, BD, KWADWO, Schizophrenia.  Known Genetic Profile: There is no relevant genetic testing available on record.              Review of cognitive, visuospatial, motor, sensory, and behavioral systems:    Memory    The patient's memory has not worsened relative to their baseline. Comment: she and her  feel memory is unchanged from baseline.  The patient does repeat statements or asks the same question repeatedly.  The patient does have difficulty remembering recent important conversations.  The patient does forget information within minutes.  the patient reports new difficulty with recall events with high fidelity/accuracy.  The patient does not have difficulty remembering recent events.  The patient's recent retrograde memory is intact.  The patient's remote memory is intact.  Attention    The patient's attention and concentration are impaired.  The patient does not have attentional fluctuations.  The patient does not have difficulty maintaining selective attention.  The patient does become easily distracted.  The patient does not have difficulty dividing their attention.  Executive    The patient's cognitive ability to process and respond to information has slowed.  The patient's cognitive ability to process and respond to information has not slowed significantly .  The patient's cognitive " ability to manage time is not severely effected.  The patient does have difficulty with working memory such as occasional reliance on external aids like notes.  The patient does not misplace personal items (e.g., keys, cell phone, wallet) more frequently.  The patient does not have a significant degree of working memory impairment.  The patient does have difficulty with Goal-Directed Behavior.  The patient does not have difficulty keeping track of their medications.  The patient is not having major difficulty keeping track of medications independently.  The patient does have difficulty with planning/organizing/completing multistep tasks requires assistance from others.  The patient does have not difficulty with multitasking/multistep tasks.  The patient does not have difficulty with recognizing and adjusting for mistakes.  The patient has not shown to have difficulty with judgement.  The patient does not have difficulty understanding abstract concepts or relationship.  The patient's insight into their health and situation is intact.  Language    The patient's speech has been not affected.  The patient's speech is fluent and non-effortful.  The patient does not forget places/people's names more frequently.  The patient does have word-finding difficulties.  The patient does not make word substitutions.  The patient's speech is grammatically intact.  The patient does not appear to have impaired sentence fluency, repetition,and phonological processing.  The patient appears to have impaired comprehension.  The patient does not appear to have difficulty comprehending and understanding written text.  Visuospatial    The patient does not become confused or disoriented in new, unfamiliar places.  The patient does not have trouble with navigation.  The patient does not get lost in familiar places.  The patient does not have visuospatial disorientation.  The patient denies problems with driving or parking.  The patient does  not have difficulty recognizing objects or faces.  Motor/Coordination    The patient does not have difficulty with walking.  The patient does not feel imbalanced.  The patient denies having fallen.  The patient does not appear to have new motor deficits.  The patient does not have difficulty buttoning shirts, operating zippers, or manipulating tools/utensils.  The patient denies new slow, small dysrhythmic movement.  The patient does not have a resting tremor.  The patient's handwriting has not become micrographic.  The patient denies restlessness.  The patient denies new and/or worsening simple repetitive behaviors.  The patient denies a change of self-stimulating behavior.  The patient's speech has not become simplified or become repetitive/stereotyped.  The patient denies having any new involuntary movements and/or muscle jerking.  The patient denies new muscle cramps and twitching.  The patient does not have swallowing difficulty.  Sensory    The patient denies new numbness, tingling, paresthesias, or pain.  The patient denies a loss of vision, blurry vision, or double vision.  The patient denies new loss of hearing or worsening tinnitus.  The patient denies anosmia.  The patient does not have hypersensitivity to environmental stimuli.  The patient does not have severe hypersensitivity to environmental stimuli.  Sleep    The patient denies difficulty sleeping. Comment: She goes to bed [id number] she falls asleep within fifteen minutes. She does not wake up during the night. wakes up between [id number] daily.  The patient does not have difficulty going to sleep.  The patient reports snoring.  The patient does not snore or have witnessed apneas while sleeping. Comment: no previous evaluation for sleep apnea.  When the patient wakes up in the morning, the patient does not feel well-rested. Comment: falls asleep 10-15 minutes at least once during the day while watching tv.  The patient denies dream-enactment  behavior.  The patient denies per-nocturnal jerking.  The patient denies symptoms suggestive of restless leg syndrome.  Neurobehavioral    The patient's personality has not changed.  The patient does note have difficulty with self-control.  The patient denies new impulsivity or rash/careless actions.  The patient does not have difficulty with Decision-Making  The patient does appear to have had a change in behavioral/emotional inertia.  The patient does not have apathy and/or decreased motivation.  The patient does not have difficulty with understanding and responding to social cues.  The patient is not exhibiting a diminished response to other people's needs and feelings.  The patient is not exhibiting symptoms of social withdrawal/indifference.  The patient is not exhibiting a diminished social interest, interrelatedness, or personal warmth.  The patient does not have symptoms to suggest a loss of manners or decorum.  The patient does not have symptoms of disinhibition and social inappropriateness.  The patient's personal hygiene is intact.  The patient does not have difficulty with flexible thinking.  The patient denies any change in rigid behavior.  The patient does not have symptoms of hyper-religiosity or dogmatism.  The patient's interests/pleasures have not become restrictive, simplified, interrupting, or repetitive.  The patient has not shown new perseverative behavior.  The patient denies new/worsening complex repetitive/ritualistic compulsions and behaviors.  The patient denies any changes in eating behavior.  The patient denies increased consumption of food or substances.  The patient denies oral exploration or consumption of inedible objects.  Psychiatric    The patient does not have difficulty with managing emotional responses effectively  The patient does not have symptoms of irritability and mood lability.  The patient does not exhibit hyperactive behavior.  The patient does not have symptoms of  agitation, aggression, or violent outbursts.  There is no report that the patient has had a change in their emotional expression.  The patient does not have emotional blunting.  The patient does not feel depressed. Comment: improved on aripiprazole.  The patient does have anxiety. Comment: improved with clonazepam.  The patient does not exhibit cycling behavior.  The patient is not exhibited symptoms of paranoia.  The patient does not have delusions.  The patient does not have hallucinations.  Medical Review of Systems    The patient does not have constipation.  The patient does not have diarrhea.  The patient does not have urinary incontinence.  The patient denies orthostatic lightheadedness.  The patient's weight is stable.  The patient does not have a history of sensitivity to neuroleptic/psychotropic medications.          Neurological Examination:    Mental Status    The patient's appearance was abnormal.  Throughout the interview, the patient is cooperative, the patient's eye contact is appropriate.  The patient behavior was inappropriate to the clinical context and situation.  The patient behavior was not characterized by episodes of sudden uncontrollable and inappropriate laughing or crying.  The patient's energy level is abnormal.  The patient can complete three-step commands.  The patient fund of knowledge was appropriate for age, culture, and level of education.  The patient's thought process is logical and goal-oriented.  The patient demonstrated appropriate insight based on actions, awareness of the patient's illness, plans for the future.  The patient demonstrated good judgment based on actions and plans for the future.  The patient has no evidence of hallucinations (auditory, visual, olfactory).  The patient has no evidence of delusions (paranoid, grandiose, bizarre).  Cranial Nerves    The patient's eyelid assessment showed no apraxia. There was no eyelid dysfunction, retraction, or weiner sign.  The  patient blink rate was normal.  The patient's facial expression was symmetric and appropriate to the context.  The patient's hearing was normal bilaterally.  The patient's tongue showed no evidence of scalloping.  The patient can protrude their tongue beyond The patient's lips for >10 sec.  The patient can move their extended tongue back and forth rapidly.  The patient had no significant evidence of anterocollis or retrocollis.  Speech/Language    The patient's speech was fluent, non-effortful, and the patient's rate was appropriate to the context.  The patient's speech volume is within normal range and appropriate to the context.  The patient's speech rate is normal.  The patient's respirations are within normal range and appropriate to context.  The patient's speech timbre is normal.  The patient has no articulation (segmental features) errors.  The patient's speech is not dysarthric.  The patient's speech was without evidence of anomia.  The patient showed no evidence of anomia during spontaneous speech.  The patient can comprehend commands that cross the midline (e.g., with your left thumb, touch your right ear).  The patient can comprehend commands that depend on syntax (e.g., point to the ceiling after you point to the floor).  The patient can comprehend syntactically complex sentences.  The patient's speech is grammatically intact; (no function/semantic word substitutions, phonemic/semantic paraphasias, or binary confusion).  Motor    The patient's bilateral upper extremity muscle bulk is appropriate.  Coordination    The patient has no visible tremor.  The patient demonstrates no alien limb phenomena.  The patient has no dyskinetic movements.  The patient has no akathisia.  The patient's bilateral upper extremity coordination with finger tapping, pronation/supination, and the open-close fist showed no slowing, no hypometria, and no dysrhythmia inconsistent with bradykinesia.  Higher Cortical Function    The  patient showed no dysexecutive behavior.  The patient showed no utilization or imitation behavior.  The patient has no perseverative or stereotyped behavior.  The patient has no stimulus-bound behavior.  Gait    The patient has normal posture sitting unaided.          Functional Capacity Assessment: Neurological Wellbeing, Intelligence, and Social Evaluation:    Instrumental Activities of Daily Living:    Communal Operations: Mild level of inability to perform independantly.  Finances: Severe level of inability to perform independantly.  Housework: Normal level of functional independance.  Personal Care: Borderline level of inability to perform independantly.  Personal Health: Normal level of functional independance.  Basic Activities of Daily Living:    Axial Motor: Normal level of functional independance.  Grooming: Normal level of functional independance.  Hygeine: Normal level of functional independance.  Oropharngeal Motor: Normal level of functional independance.  Personal Health: Normal level of functional independance.  Toiletry: Normal level of functional independance.  Functional Capacity Scales:    IADL: Score 1/8 suggestive of Normal.  FAST: Score 1/16 suggestive of Normal (Stage 1).  FAS: Score 4/30 suggestive of Normal.  CDR-SOB: Score 2.5/18 suggestive of Normal.  Global CDR: Score 0.5/3 suggestive of Questionable cognitive impairment.  Memory: 1  Orientation: 0  Judgment & Problem Solvin.5  Community Affairs: 0.5  Home and Hobbies: 0.5  Personal Care: 0          Clinical Summary/Interpretation:    Neurobehavioral/Neuropsychiatric Evaluation Interpretation: The review of systems reveals various neurological and neurocognitive deficits, with specific dysfunctions linked to cortical and subcortical regions. Short-term memory (STM) deficits, ranging from mild to severe, and working memory issues suggest involvement of the hippocampus and prefrontal cortex, impacting attention, concentration, and  memory confidence. Anomia and auditory comprehension difficulties indicate potential dysfunction in the left temporal lobe, while mood instability and apathy point to disruptions in the limbic system and frontal-subcortical circuits. Moderate apnea and excessive daytime sleepiness (EDS) highlight possible brainstem involvement, affecting sleep regulation and overall cognitive function.  BEHAV5+: Score 0/5 indicates minimal to no significant behavioral symptoms.  Neurological Examination Interpretation: The neurological examination revealed deficits in arousal, impaired grooming, and impaired behavior, which suggest dysfunction in specific cortical and subcortical regions. Arousal deficits typically indicate issues in the reticular activating system or its connections, implicating brainstem or thalamic involvement. Impaired grooming often points to dysfunction in the prefrontal cortex or basal ganglia, regions associated with motor planning and execution. Behavioral impairments are commonly linked to dysfunctions in the frontal lobe, particularly the orbitofrontal cortex, which is crucial for regulating complex behaviors. Together, these abnormalities suggest network pathologies involving frontal-subcortical circuits that impact executive functions and behavioral regulation.  Neurological Imaging Summary:  MRI brain/head without contrast performed on 02/06/2025  Radiologist Interpretation: here are no concerning focal areas of abnormal signal, restricted diffusion, or abnormal enhancement within the brain. Mild global parenchymal volume loss for age. No asymmetric volume loss identified. No mass, hemorrhage or acute infarct is present. No space-occupying extra-axial fluid collections. Incidentally noted small left cerebellar developmental venous anomaly. Midline Structures: The midline structures of the brain are normal. Ventricles: The ventricles, sulci and cisterns are within normal limits. Vasculature: The  vascular flow voids at the base of the brain are within normal limits. Calvarium: The visualized osseous structures are unremarkable. Sinuses: Moderate scattered opacification of the paranasal sinuses most notably the ethmoid and right maxillary sinus. Orbits: The orbits and globes are unremarkable. Mastoids: The mastoid air cells are adequately aerated. Extracranial soft tissues: Presumed scattered sebaceous cysts/inclusion cyst most pronounced at the level the right frontal scalp measuring 14 mm. Impression: 1. Negative contrast-enhanced MRI of the brain for age. No acute process. 2. 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      The patient's clinical profile, considering their history and age/education-adjusted cognitive benchmarks, strongly indicates intact cognitive functioning.    Global Clinical Dementia Rating (CDR): 0.5/3 suggestive of Questionable cognitive impairment.  Clinical Dementia Rating Sum of Boxes (CDR-SOB): 2.5/18 suggestive of Normal.  [facility] Functional Assessment Scale (FAS): 4/30 suggestive of Normal.  Functional Assessment Staging Tool (FAST Scale): 1/16 suggestive of Normal (Stage 1).  [place] Instrumental Activities of Daily Living Scale: 1/8 suggestive of Normal.    The clinical manifestation observed in the patient does not align with any specific neurodegenerative syndrome.      Currently, definitive diagnosis of all neurodegenerative diseases can only be achieved through a brain autopsy. The underlying neuropathology suspected to be causing the patient's neurocognitive impairment remains uncertain.    Serum fluid protein biomarkers include Phospho-Tau (181P) Serum: 1.71 (H) on 01/09/2025 Neurofilament Light Chain: 11.6 (N) on 01/09/2025  There are no cerebrospinal fluid protein biomarkers  "available on record.  There are no dermatological protein biomarkers available on record.  There is no relevant genetic biomarkers available on record.  Neurological Radiographic biomarkers include  MRI brain/head without contrast performed on 02/06/2025  Radiologist Interpretation: here are no concerning focal areas of abnormal signal, restricted diffusion, or abnormal enhancement within the brain. Mild global parenchymal volume loss for age. No asymmetric volume loss identified. No mass, hemorrhage or acute infarct is present. No space-occupying extra-axial fluid collections. Incidentally noted small left cerebellar developmental venous anomaly. Midline Structures: The midline structures of the brain are normal. Ventricles: The ventricles, sulci and cisterns are within normal limits. Vasculature: The vascular flow voids at the base of the brain are within normal limits. Calvarium: The visualized osseous structures are unremarkable. Sinuses: Moderate scattered opacification of the paranasal sinuses most notably the ethmoid and right maxillary sinus. Orbits: The orbits and globes are unremarkable. Mastoids: The mastoid air cells are adequately aerated. Extracranial soft tissues: Presumed scattered sebaceous cysts/inclusion cyst most pronounced at the level the right frontal scalp measuring 14 mm. Impression: 1. Negative contrast-enhanced MRI of the brain for age. No acute process. 2. Moderate paranasal sinus disease as above          Impression:    The patient has a one-year history of progressive cognitive impairment, beginning in late 2022. Initially, she exhibited behavioral and psychiatric changes, including increased anxiety and symptoms reminiscent of "empty nest syndrome." Over the past year, her cognitive symptoms have become more pronounced, with significant short-term memory loss, repeated questioning, and difficulty maintaining conversations. These issues have raised concerns from her . Her " Dexter Cognitive Assessment (MOCA) score was recently recorded as 22 out of 30, indicating mild cognitive impairment. Elevated P-tau 181 levels suggest potential Alzheimer's disease pathology, further supported by a family history of dementia in her mother. A neurological examination reveals deficits in short-term memory, organization, and apathy, as well as word-finding difficulties and impaired auditory comprehension. Sleep disturbances, including moderate apnea, excessive daytime sleepiness, and mild snoring, exacerbate her cognitive challenges. The patient also demonstrates impaired grooming and behavior, consistent with the trajectory of cognitive decline. These clinical findings, combined with biomarker evidence, suggest a neurocognitive disorder consistent with Alzheimer's disease.      The above observations were discussed with the patient and their supporting historian(s). We have discussed the additional diagnostic(s) and/or management below.            Care Management Plan:    Diagnostic Screening for reversible forms of neurocognitive disorders  We recommend screening for reversible causes of neurocognitive impairment with plasma laboratories.  We have ordered plasma CBC, CMP, Vitamins (B1, B9, B12), Mg, RPR, MMA, HcY, TSH, T4, Nfl, ptau-181, [id number], GFAP serum.  We recommend screening for anatomical CNS lesions/neurodegenerative patterns.  We have ordered an MRI brain without contrast - Dementia Protocol  Consult 7/31/25 at 1030  Diagnostic Screening for measurable forms of neurodegenerative pathology.  We have discussed opportunities for biomarker testing (CSF Goel biomarkers, IDEAs Amyloid-PET, Syn-One skin biopsy).  Optimize Sleep Hygiene and Quality  We discussed and recommended additional diagnostic/management of sleep disorder to optimize brain health and longevity.  We have placed referrals to sleep medicine due to signs and symptoms suggestive of sleep apnea.    The care plan was developed  collaboratively with the patient and caregiver, addressing their goals of maintaining functional independence while planning for potential disease progression.      Thank you for entrusting us with the care of your patient. Should you have any questions or concerns, please feel free to contact us at your convenience.            Billing Statement:    I performed this consultation using real-time Telehealth tools, including a live video connection between my location and the patient's location (their home within the [facility]). Prior to initiating the consultation, I obtained informed verbal consent to perform this consultation using Telehealth tools and answered all the questions about the Telehealth interaction. The participants understood that only a limited neurological exam and limited neuropsychological testing could be performed using Telehealth tools.      I spent a total of 62 minutes (from 11:04 AM to 12:06 PM) on 06/26/2025, engaging in person in a face-to-face consultation with the patient. More than 50% of this time was devoted to counseling on symptoms, treatment plans, risks, therapeutic options, lifestyle modifications, and safety concerns related to the above diagnoses.      A Review of Systems was completed, encompassing 10 of the 14 systems. All findings were negative, except those noted in the History of Present Illness (HPI) and Review of Systems (ROS) Sections. The systems reviewed were Constitutional (Const), Eyes, Ear/Nose/Throat (ENT), Respiratory (Resp), Cardiovascular (CV), Gastrointestinal (GI), Genitourinary (), Musculoskeletal (MSK), Skin, and Neurological (Neuro).      I spent a total of 20 minutes reviewing and summarizing records from outside physicians on the day of the clinical evaluation. This review and summary were conducted as described in the History of Present Illness (HPI) and Assessment.      Total Billing time spent on encounter/documentation for this patient's  evaluation and management: 82 minutes.        This note was dictated using the [employer] Fluency Direct voice recognition program. Please be aware that word recognition errors may occasionally occur and might be overlooked during review. [phone]

## 2025-06-27 ENCOUNTER — TELEPHONE (OUTPATIENT)
Facility: CLINIC | Age: 60
End: 2025-06-27
Payer: COMMERCIAL

## 2025-06-27 NOTE — TELEPHONE ENCOUNTER
----- Message from Kera Sewell NP sent at 6/26/2025  8:45 AM CDT -----  Please schedule labs before consult

## 2025-06-27 NOTE — TELEPHONE ENCOUNTER
Called and spoke with patient's spouse  Assisted with scheduling labs before 2/2 Neurocognitive Appt    Patient is scheduled for labs on 7/1/2025.    -----------------    Also assisted with scheduling cognitive testing before 2/2 Neurocognitive Appt  Patient is scheduled to see NA on 7/15/2025 at 11am

## 2025-07-01 ENCOUNTER — LAB VISIT (OUTPATIENT)
Dept: LAB | Facility: HOSPITAL | Age: 60
End: 2025-07-01
Attending: NURSE PRACTITIONER
Payer: COMMERCIAL

## 2025-07-01 DIAGNOSIS — E53.8 B12 DEFICIENCY: ICD-10-CM

## 2025-07-01 DIAGNOSIS — R41.3 MEMORY LOSS: ICD-10-CM

## 2025-07-01 LAB
ABSOLUTE EOSINOPHIL (SMH): 0.35 K/UL
ABSOLUTE MONOCYTE (SMH): 0.45 K/UL (ref 0.3–1)
ABSOLUTE NEUTROPHIL COUNT (SMH): 3.6 K/UL (ref 1.8–7.7)
ALBUMIN SERPL-MCNC: 4.1 G/DL (ref 3.5–5.2)
ALP SERPL-CCNC: 127 UNIT/L (ref 55–135)
ALT SERPL-CCNC: 35 UNIT/L (ref 10–44)
ANION GAP (SMH): 7 MMOL/L (ref 8–16)
AST SERPL-CCNC: 28 UNIT/L (ref 10–40)
BASOPHILS # BLD AUTO: 0.02 K/UL
BASOPHILS NFR BLD AUTO: 0.3 %
BILIRUB SERPL-MCNC: 0.4 MG/DL (ref 0.1–1)
BUN SERPL-MCNC: 10 MG/DL (ref 8–23)
CALCIUM SERPL-MCNC: 9.5 MG/DL (ref 8.7–10.5)
CHLORIDE SERPL-SCNC: 105 MMOL/L (ref 95–110)
CO2 SERPL-SCNC: 29 MMOL/L (ref 23–29)
CREAT SERPL-MCNC: 0.8 MG/DL (ref 0.5–1.4)
ERYTHROCYTE [DISTWIDTH] IN BLOOD BY AUTOMATED COUNT: 13.5 % (ref 11.5–14.5)
FOLATE SERPL-MCNC: 8.9 NG/ML (ref 4–24)
GFR SERPLBLD CREATININE-BSD FMLA CKD-EPI: >60 ML/MIN/1.73/M2
GLUCOSE SERPL-MCNC: 96 MG/DL (ref 70–110)
HCT VFR BLD AUTO: 40.5 % (ref 37–48.5)
HGB BLD-MCNC: 13.1 GM/DL (ref 12–16)
IMM GRANULOCYTES # BLD AUTO: 0.03 K/UL (ref 0–0.04)
IMM GRANULOCYTES NFR BLD AUTO: 0.5 % (ref 0–0.5)
LYMPHOCYTES # BLD AUTO: 1.75 K/UL (ref 1–4.8)
MAGNESIUM SERPL-MCNC: 2.2 MG/DL (ref 1.6–2.6)
MCH RBC QN AUTO: 29.5 PG (ref 27–31)
MCHC RBC AUTO-ENTMCNC: 32.3 G/DL (ref 32–36)
MCV RBC AUTO: 91 FL (ref 82–98)
NUCLEATED RBC (/100WBC) (SMH): 0 /100 WBC
PLATELET # BLD AUTO: 206 K/UL (ref 150–450)
PMV BLD AUTO: 9.7 FL (ref 9.2–12.9)
POTASSIUM SERPL-SCNC: 3.9 MMOL/L (ref 3.5–5.1)
PROT SERPL-MCNC: 7.2 GM/DL (ref 6–8.4)
RBC # BLD AUTO: 4.44 M/UL (ref 4–5.4)
RELATIVE EOSINOPHIL (SMH): 5.7 % (ref 0–8)
RELATIVE LYMPHOCYTE (SMH): 28.4 % (ref 18–48)
RELATIVE MONOCYTE (SMH): 7.3 % (ref 4–15)
RELATIVE NEUTROPHIL (SMH): 57.8 % (ref 38–73)
SODIUM SERPL-SCNC: 141 MMOL/L (ref 136–145)
T PALLIDUM IGG+IGM SER QL: NORMAL
T4 FREE SERPL-MCNC: 0.83 NG/DL (ref 0.71–1.51)
TSH SERPL-ACNC: 2.31 UIU/ML (ref 0.34–5.6)
WBC # BLD AUTO: 6.16 K/UL (ref 3.9–12.7)

## 2025-07-01 PROCEDURE — 85025 COMPLETE CBC W/AUTO DIFF WBC: CPT

## 2025-07-01 PROCEDURE — 84425 ASSAY OF VITAMIN B-1: CPT

## 2025-07-01 PROCEDURE — 83520 IMMUNOASSAY QUANT NOS NONAB: CPT

## 2025-07-01 PROCEDURE — 83921 ORGANIC ACID SINGLE QUANT: CPT

## 2025-07-01 PROCEDURE — 86593 SYPHILIS TEST NON-TREP QUANT: CPT

## 2025-07-01 PROCEDURE — 84439 ASSAY OF FREE THYROXINE: CPT

## 2025-07-01 PROCEDURE — 83735 ASSAY OF MAGNESIUM: CPT

## 2025-07-01 PROCEDURE — 36415 COLL VENOUS BLD VENIPUNCTURE: CPT

## 2025-07-01 PROCEDURE — 82746 ASSAY OF FOLIC ACID SERUM: CPT

## 2025-07-01 PROCEDURE — 84443 ASSAY THYROID STIM HORMONE: CPT

## 2025-07-01 PROCEDURE — 82565 ASSAY OF CREATININE: CPT

## 2025-07-03 LAB — LABCORP MISCELLANEOUS TEST RESULT: NORMAL

## 2025-07-04 LAB — LABCORP MISCELLANEOUS TEST RESULT: NORMAL

## 2025-07-05 LAB — METHYLMALONATE SERPL-SCNC: 226 NMOL/L (ref 0–378)

## 2025-07-06 LAB
LC GLIAL FIBRILLARY ACID PROTEIN: 73.9 PG/ML (ref 0–186)
LC PHOSPHO-TAU (181P): 1.53 PG/ML (ref 0–0.97)

## 2025-07-07 LAB
LABCORP MISCELLANEOUS TEST RESULT: NORMAL
LABCORP MISCELLANEOUS TEST RESULT: NORMAL
VIT B1 BLD-SCNC: 108.3 NMOL/L (ref 66.5–200)

## 2025-07-14 ENCOUNTER — OFFICE VISIT (OUTPATIENT)
Dept: PSYCHIATRY | Facility: CLINIC | Age: 60
End: 2025-07-14
Payer: COMMERCIAL

## 2025-07-14 VITALS
WEIGHT: 189.25 LBS | HEART RATE: 80 BPM | BODY MASS INDEX: 34.82 KG/M2 | SYSTOLIC BLOOD PRESSURE: 97 MMHG | DIASTOLIC BLOOD PRESSURE: 67 MMHG | HEIGHT: 62 IN

## 2025-07-14 DIAGNOSIS — F41.1 GENERALIZED ANXIETY DISORDER WITH PANIC ATTACKS: ICD-10-CM

## 2025-07-14 DIAGNOSIS — F33.41 RECURRENT MAJOR DEPRESSIVE DISORDER, IN PARTIAL REMISSION: Primary | ICD-10-CM

## 2025-07-14 DIAGNOSIS — R45.89 LONELINESS: ICD-10-CM

## 2025-07-14 DIAGNOSIS — F41.0 GENERALIZED ANXIETY DISORDER WITH PANIC ATTACKS: ICD-10-CM

## 2025-07-14 DIAGNOSIS — Z79.899 LONG TERM CURRENT USE OF ANTIPSYCHOTIC MEDICATION: ICD-10-CM

## 2025-07-14 DIAGNOSIS — G31.84 MCI (MILD COGNITIVE IMPAIRMENT): ICD-10-CM

## 2025-07-14 PROBLEM — F33.1 MODERATE EPISODE OF RECURRENT MAJOR DEPRESSIVE DISORDER: Status: RESOLVED | Noted: 2025-02-10 | Resolved: 2025-07-14

## 2025-07-14 PROCEDURE — 1159F MED LIST DOCD IN RCRD: CPT | Mod: CPTII,S$GLB,, | Performed by: STUDENT IN AN ORGANIZED HEALTH CARE EDUCATION/TRAINING PROGRAM

## 2025-07-14 PROCEDURE — 99999 PR PBB SHADOW E&M-EST. PATIENT-LVL III: CPT | Mod: PBBFAC,,, | Performed by: STUDENT IN AN ORGANIZED HEALTH CARE EDUCATION/TRAINING PROGRAM

## 2025-07-14 PROCEDURE — 3074F SYST BP LT 130 MM HG: CPT | Mod: CPTII,S$GLB,, | Performed by: STUDENT IN AN ORGANIZED HEALTH CARE EDUCATION/TRAINING PROGRAM

## 2025-07-14 PROCEDURE — 1160F RVW MEDS BY RX/DR IN RCRD: CPT | Mod: CPTII,S$GLB,, | Performed by: STUDENT IN AN ORGANIZED HEALTH CARE EDUCATION/TRAINING PROGRAM

## 2025-07-14 PROCEDURE — 3044F HG A1C LEVEL LT 7.0%: CPT | Mod: CPTII,S$GLB,, | Performed by: STUDENT IN AN ORGANIZED HEALTH CARE EDUCATION/TRAINING PROGRAM

## 2025-07-14 PROCEDURE — 99214 OFFICE O/P EST MOD 30 MIN: CPT | Mod: S$GLB,,, | Performed by: STUDENT IN AN ORGANIZED HEALTH CARE EDUCATION/TRAINING PROGRAM

## 2025-07-14 PROCEDURE — 96136 PSYCL/NRPSYC TST PHY/QHP 1ST: CPT | Mod: 59,S$GLB,, | Performed by: STUDENT IN AN ORGANIZED HEALTH CARE EDUCATION/TRAINING PROGRAM

## 2025-07-14 PROCEDURE — 3008F BODY MASS INDEX DOCD: CPT | Mod: CPTII,S$GLB,, | Performed by: STUDENT IN AN ORGANIZED HEALTH CARE EDUCATION/TRAINING PROGRAM

## 2025-07-14 PROCEDURE — 3078F DIAST BP <80 MM HG: CPT | Mod: CPTII,S$GLB,, | Performed by: STUDENT IN AN ORGANIZED HEALTH CARE EDUCATION/TRAINING PROGRAM

## 2025-07-14 PROCEDURE — G2211 COMPLEX E/M VISIT ADD ON: HCPCS | Mod: S$GLB,,, | Performed by: STUDENT IN AN ORGANIZED HEALTH CARE EDUCATION/TRAINING PROGRAM

## 2025-07-14 RX ORDER — ARIPIPRAZOLE 2 MG/1
2 TABLET ORAL DAILY
Qty: 30 TABLET | Refills: 2 | Status: SHIPPED | OUTPATIENT
Start: 2025-07-14 | End: 2025-10-12

## 2025-07-14 RX ORDER — CLONAZEPAM 0.25 MG/1
0.25 TABLET, ORALLY DISINTEGRATING ORAL DAILY
Qty: 30 TABLET | Refills: 1 | Status: SHIPPED | OUTPATIENT
Start: 2025-07-14

## 2025-07-14 RX ORDER — SERTRALINE HYDROCHLORIDE 100 MG/1
200 TABLET, FILM COATED ORAL DAILY
Qty: 60 TABLET | Refills: 2 | Status: SHIPPED | OUTPATIENT
Start: 2025-07-14 | End: 2025-10-12

## 2025-07-14 NOTE — PROGRESS NOTES
"    Outpatient Psychiatry Followup Visit - IN PERSON  7/14/2025  Assessment & Plan    Impression     ICD-10-CM ICD-9-CM   1. Recurrent major depressive disorder, in partial remission  F33.41 296.35   2. Generalized anxiety disorder with panic attacks  F41.1 300.02    F41.0 300.01   3. Loneliness  R45.89 V62.89   4. MCI (mild cognitive impairment)  G31.84 331.83   5. Long term current use of antipsychotic medication  Z79.899 V58.69   6. BMI 34.0-34.9,adult  Z68.34 V85.34      Plan of Care & Medication Management    Chart was reviewed. The risks and benefits of medication were discussed with pt. The treatment plan and followup plan were reviewed with pt. Pt understands to contact clinic if symptoms worsen. Pt understands to call 911 or go to nearest ER for suicidal ideation, intent or plan. Unless otherwise specified, pt did NOT display signs of nor endorse symptoms of overt psychosis or acute mood disorder requiring hospitalization during the encounter; pt denied violent thoughts or suicidal or homicidal ideation, intent, or plan.   RX History KLONOPIN, LEXAPRO ("I guess it worked out"), REXULTI (pt unsure of response), VALIUM, ZOLOFT   Current RX Continue ABILIFY  Pt was provided [employer] educational material 4/21/2025  Metabolic monitoring:  MAY2025 , , A1C 5.5  MAY2025 BMI 33.7  APR2025 BMI 32.8  Adjustments:  4/21/2025: Start 2mg daily  Prior to 4/21/2025 pt was taking ZOLOFT 200mg hs  Reduce KLONOPIN  10/31/2024 Signed contract, reviewed   Adjustments:  7/14/2025: Reduce to 0.25mg daily scheduled  7/14/2025: Reports taking 0.5mg daily scheduled  10/31/2024: Continue 0.5mg daily prn anxiety  Prior to 10/31/2024, pt had been taking 0.5mg daily prn anxiety  Continue ZOLOFT   Adjustments:  3/11/2025: increase to 200mg hs   2/10/2025: Increase to 150mg HS  1/13/2025: Increase to 125mg HS  10/31/2024: Continue 100mg HS  Prior to 10/31/2024, pt had been taking 100mg HS      Other []CONTRACT 7/14/2025?  [] " "REVIEWED 7/14/2025?  []   RETURN N: STANDARD PROTOCOL: RETURN IN 6 WEEKS       Subjective    Available documentation has been reviewed, and pertinent elements of the chart have been incorporated into this note where appropriate. Last Epic encounter with writer was on 5/21/2025   Tammy Yao, a 60 y.o. female, presenting for followup visit. This visit was done in person, IN CLINIC. Accompanied by .     Since starting ABILIFY feels less lonely when alone  Reports memory is somewhat better    Points to small bruise on R dorsal forearm  Pt asks if due to ABILIFY, this is unlikely  More likely cause is injury    Denies depressed mood     Will be seeing NEUROLOGY soon, workup pending  Addressed expectations with pt and     Labs reviewed  Counseled on lipid profile, education provided    Pt was encouraged to keep therapy appointments     Taking KLONOPIN 0.5mg everyday at 11am  Discussed trial to reduce KLONOPIN from 0.5mg to 0.25mg in order to reduce cognitive burden  Next visit will determine whether to proceed with taper or to bring Dr. Brown (addiction medicine) onto treatment team  Adjust visit frequency to q6w  Continue treatment otherwise as planned        Objective    Vitals:    07/14/25 0954   BP: 97/67   Pulse: 80   Weight: 85.9 kg (189 lb 4.2 oz)   Height: 5' 2" (1.575 m)     (Current body mass index is 34.62 kg/m².)    MSE/PE  1. Appearance: Dress is informal but appropriate. Motor activity normal.  2. Discourse: Clear speech with normal rate and volume. Associations intact. Orderly.  3. Emotional Expression: Somewhat anxious.  4. Perception and Thinking: No hallucinations. No suicidality, no homicidality, delusions, or paranoia.  5. Sensorium: Grossly intact. Able to focus for interview.  6. Memory and Fund of Knowledge: Intact for content of interview.  7. Insight and Judgment: Intact.       Measurement-Based Care (MBC):     Routine Instruments   PROMIS-ANXIETY Interpretation: 10 (4a raw " score): T-SCORE 59.5; MILD using 55-60-70 cutoffs.   PROMIS-DEPRESSION Interpretation: 7 (4a raw score): T-SCORE 53.9; NONE TO SLIGHT using 55-60-70 cutoffs.   WHO-5: 18 raw: 72%   Additional Instruments   MBC ANCHOR : a little better         Auto-populated Chart Data:     The chart was reviewed for recent diagnostic procedures and investigations, and pertinent results are noted below.   Encounter Medications  Encounter Medications[1]   Cardiometabolic  EKG Results  No results found for this or any previous visit.     Labs  Lab Results   Component Value Date    TSH 2.313 07/01/2025    FREET4 0.83 07/01/2025    GLU 96 07/01/2025    HGBA1C 5.5 05/29/2025    CHOL 311 (H) 05/29/2025    TRIG 97 05/29/2025    HDL 58 05/29/2025    LDLCALC 233.6 (H) 05/29/2025       BMI Trend - (Current body mass index is 34.62 kg/m².)  Wt Readings from Last 7 Encounters:   07/14/25 85.9 kg (189 lb 4.2 oz)   05/21/25 83.7 kg (184 lb 6.6 oz)   05/20/25 84.9 kg (187 lb 4.5 oz)   04/23/25 80.7 kg (177 lb 14.6 oz)   04/21/25 81.3 kg (179 lb 3.7 oz)   03/11/25 81.1 kg (178 lb 14.5 oz)   02/10/25 80.2 kg (176 lb 12.9 oz)       BP/HR Trend   BP Readings from Last 3 Encounters:   07/14/25 97/67   05/21/25 115/66   05/20/25 103/73      Pulse Readings from Last 3 Encounters:   07/14/25 80   05/21/25 79   05/20/25 83       Endocrine Lab Results   Component Value Date    TSH 2.313 07/01/2025    FREET4 0.83 07/01/2025      Hematologic   Lab Results   Component Value Date    WBC 6.16 07/01/2025    RBC 4.44 07/01/2025    HGB 13.1 07/01/2025    HCT 40.5 07/01/2025    MCV 91 07/01/2025    MCH 29.5 07/01/2025    RDW 13.5 07/01/2025     07/01/2025    MPV 9.7 07/01/2025    GRAN 5.6 01/09/2025    GRAN 64.7 01/09/2025      Hepatorenal Lab Results   Component Value Date     07/01/2025    K 3.9 07/01/2025    CALCIUM 9.5 07/01/2025    MG 2.2 07/01/2025    CO2 29 07/01/2025    ANIONGAP 7 (L) 07/01/2025    CREATININE 0.8 07/01/2025    BUN 10 07/01/2025  "   EGFRNORACEVR >60 07/01/2025    AST 28 07/01/2025    ALT 35 07/01/2025    BILITOT 0.4 07/01/2025    ALBUMIN 4.1 07/01/2025      Medication Level No results found for: "LITHIUM", "VALPROATE", "CBMZ", "CARBAMAZ", "LAMOTRIGINE", "PHENYTOIN", "PHENOBARB", "CLOZAPINE", "NORCLOZAP", "CLOZNORCLOZ", "CLONAZEPAM", "NESHA", "VENLAFAXINE", "NORTRIP", "OXCARBAZ"   Other Labs Lab Results   Component Value Date    THIAMINEBLOO 68 01/09/2025    YEVFPIRF92 829 05/20/2025    HEPCAB Non Reactive 04/28/2023    HSB31OKYB Non-reactive 01/09/2025    RPR Non-reactive 01/09/2025      Drug Screening   AMP * (*) METHAMP * (*)   MODESTO * (*) MORPH * (*)   BUP * (*) OXY * (*)   BENZO * (*) METHADONE * (*)   MENDEZ * (*) THC * (*)   HX No results found for: "AMPHETAMINES", "PCDSOPHENCYN", "COCAINEMETAB", "POCCOC", "COCAINE", "MENDEZ", "COCAINEURINE", "POCCOCDRUG", "PCDSUTRAMA", "PCDSOBENZOD", "BARBITURATES", "PCDSCOMETHA", "OPIATESCREEN", "PCDSUBUPRE", "PCDSUFENTANY", "PCDSUOXYCOD", "BUPRENORPH", "NORBUPRENOR", "MARIJUANATHC"  No results found for: "PCDSOALCOHOL", "ALCOHOLMEDIC", "THEYLGLUCU", "ETHYLSULF", "[id number]", "PETH"         Billing Documentation:     Method IN PERSON visit at the clinic, [place]   E/M [address]: FOLLOW UP VISIT, Rx mgmt, "Multiple STABLE chronic illnesses"   Additional 42159, with modifer 59: administered and scored more than one psychological or neuropsychological tests (see MBC above) (16+ mins)  , without modifiers -24,-25,-53: COMPLEXITY: Visit today included increased complexity associated with the care of the episodic problem(s) (multiple psychiatric disorders - see above) addressed and managing the longitudinal care of the patient due to the serious and/or complex managed problem(s) (multiple psychiatric disorders - see above).              Andrew Swan DO  Department of Psychiatry, [facility]             [1]   Outpatient Encounter Medications as of 7/14/2025   Medication Sig Dispense Refill    " ARIPiprazole (ABILIFY) 2 MG Tab Take 1 tablet (2 mg total) by mouth once daily. 30 tablet 2    clonazePAM (KLONOPIN) 0.25 MG TbDL Take 1 tablet (0.25 mg total) by mouth once daily. Allow to dissolve in mouth. 30 tablet 1    cyanocobalamin (VITAMIN B-12) 1000 MCG tablet Take 1,000 mcg by mouth once daily.      sertraline (ZOLOFT) 100 MG tablet Take 2 tablets (200 mg total) by mouth once daily. 60 tablet 2    [DISCONTINUED] ARIPiprazole (ABILIFY) 2 MG Tab Take 1 tablet (2 mg total) by mouth once daily. 30 tablet 2    [DISCONTINUED] clonazePAM (KLONOPIN) 0.5 MG tablet Take 1 tablet (0.5 mg total) by mouth daily as needed for Anxiety. 30 tablet 1    [DISCONTINUED] sertraline (ZOLOFT) 100 MG tablet Take 2 tablets (200 mg total) by mouth once daily. 60 tablet 2     No facility-administered encounter medications on file as of 7/14/2025.

## 2025-07-14 NOTE — PATIENT INSTRUCTIONS
"Your lipid profile shows TOTAL CHOLESTEROL that is higher than it should be, which can raise your risk for heart disease and stroke. To help lower it, try brisk walking most days, cut back on sugar, and avoid saturated and trans fats found in fried foods and fatty meats. LDL is the "lousy" cholesterol because too much can lead to heart disease, stroke, and poor blood flow to your legs. You can lower LDL by eating less saturated and trans fats (in fried foods, fatty meats, and snacks), cutting back on sugar, and staying active with regular exercise like brisk walking. A plant-based diet helps some people. These changes usually help improve the level within 6 to 12 months.     Followup with primary care regarding your cholesterol    Keep followup appointments with neurology    Reduce KLONOPIN from 0.5mg everyday to 0.25mg everyday  Continue other medicine as prescribed     Please keep therapy appointments          "

## 2025-07-15 ENCOUNTER — OUTPATIENT CASE MANAGEMENT (OUTPATIENT)
Dept: NEUROLOGY | Facility: CLINIC | Age: 60
End: 2025-07-15
Payer: COMMERCIAL

## 2025-07-15 ENCOUNTER — DOCUMENTATION ONLY (OUTPATIENT)
Facility: CLINIC | Age: 60
End: 2025-07-15
Payer: COMMERCIAL

## 2025-07-15 DIAGNOSIS — G31.84 MCI (MILD COGNITIVE IMPAIRMENT): Primary | ICD-10-CM

## 2025-07-24 ENCOUNTER — TELEPHONE (OUTPATIENT)
Dept: FAMILY MEDICINE | Facility: CLINIC | Age: 60
End: 2025-07-24
Payer: COMMERCIAL

## 2025-07-24 NOTE — TELEPHONE ENCOUNTER
Returned call to patient spoke with her spouse. Patient has been scheduled for 8/25/25. Verbal understanding was expressed.

## 2025-07-24 NOTE — TELEPHONE ENCOUNTER
----- Message from Med Assistant Vasques sent at 7/24/2025 11:09 AM CDT -----  Pt recently did labs with neurologist and was told pt should follow up with PCP due to labs show High cholesterol. I'm not seeing any available apts not until sept pt would like to be seen sooner and only with Dr Newton     Pt #[phone]

## 2025-07-25 NOTE — PROGRESS NOTES
[facility]  Neurocognitive Assessment      Testing Date: 7/15/2025     Testing performed by: SORAIDA Bach (Neuropsychometrist Assistant)  Supervising Physician: Taran Hernandez MD     Patient underwent standardized neurocognitive testing administered by SORAIDA Bach under the direct supervision of Dr. Taran Hernandez MD.     Tests administered included the [facility] Neurocognitive Battery V.4 which includes but is not limited to : [MMSE, MoCA, Trails A & B, Digit Span, CVLT-I, Pancho-Osterrieth Complex Figure, [place] Naming Test, FAS/COWAT, Semantic Fluency, SYDBAT].     Assessment ID#[id number]     Total time spent on test administration and scorin minutes.     Medical necessity: Neurocognitive testing was medically necessary to evaluate [suspected mild cognitive impairment vs. early Alzheimers disease] to assist in diagnosis, treatment planning, and medical management.

## 2025-07-26 ENCOUNTER — OUTPATIENT CASE MANAGEMENT (OUTPATIENT)
Dept: NEUROLOGY | Facility: CLINIC | Age: 60
End: 2025-07-26
Payer: COMMERCIAL

## 2025-07-26 DIAGNOSIS — G31.84 MCI (MILD COGNITIVE IMPAIRMENT): Primary | ICD-10-CM

## 2025-07-28 ENCOUNTER — OUTPATIENT CASE MANAGEMENT (OUTPATIENT)
Dept: NEUROLOGY | Facility: CLINIC | Age: 60
End: 2025-07-28
Payer: COMMERCIAL

## 2025-07-28 DIAGNOSIS — R46.89 COGNITIVE AND BEHAVIORAL CHANGES: Primary | ICD-10-CM

## 2025-07-28 DIAGNOSIS — R41.89 COGNITIVE AND BEHAVIORAL CHANGES: Primary | ICD-10-CM

## 2025-07-28 PROCEDURE — 99358 PROLONG SERVICE W/O CONTACT: CPT | Mod: S$GLB,,, | Performed by: PSYCHIATRY & NEUROLOGY

## 2025-07-29 NOTE — PROGRESS NOTES
[facility]  Brain Health and Cognitive Disorders Program    PATIENT: Tammy Yao  DATE: 07/28/2025  MRN: [id number]  PRIMARY PROVIDER: Wilbert Newton MD    Future Appointments   Date Time Provider Department Center   7/31/2025 10:30 AM Taran Hernandez MD [facility]   8/21/2025 10:00 AM [facility]   8/21/2025 11:00 AM Andrew Swan DO [facility]   8/25/2025 11:00 [facility]   10/23/2025 10:00 AM Wilbert Newton MD [facility]     Pre-Clinical Chart Review:     I conducted a thorough review of previous records and/or communicated with other professionals or the patient's family for a total duration of 40 minutes (from 11:30 AM to 12:10 PM) on on 07/28/2025. This activity is directly related to the face-to-face Evaluation and Management service provided to the patient.    For billing purposes, the CPT code for prolonged evaluation and management services, including non-face-to-face review of records or communications with the patient's family or other medical professionals, is 96684.    See below for the review of these [facility]:      Clinical Timeline:    (Event Date: 2024-10) During an October 2024 evaluation, the patient, a 58-year-old female, exhibited symptoms of confusion and nervous behavior, and scored 26/30 on the MMSE, prompting a neurology referral due to concerns about potential dementia.  (Event Date: 2025-01) In January 2025, the neurologist noted the patient's repetitive questioning and conversational difficulties, assessed with a MoCA score of 21/30, and discussed possible mild cognitive impairment, considering psychological factors despite treatment with psychotherapy and medication.  (Event Date: 2025-05) By May 2025, the patient's memory and conversational issues persisted, with a MoCA score remaining at 21/30, but her anxiety showed improvement after  starting Abilify, although her ability to manage finances and cooking without assistance was limited.  (Event Date: 2025-06) In June 2025, the neurologist evaluated the patient, who showed elevated P-tau 181 levels and a MoCA score of 22/30, leading to discussions on potential Alzheimer's disease and the consideration of anti-amyloid infusion therapy.  (Event Date: 2025-07) In July 2025, during a psychiatric follow-up, the patient was noted to have mild cognitive impairment (MCI) and a partial remission of depressive disorder, with a focus on reducing Klonopin dosage to mitigate cognitive decline.    Clinical History Summary:     The patient, a 60-year-old right-handed female, was evaluated by various healthcare providers over multiple visits from October 2024 to July 2025 due to a complex medical history involving depression, anxiety, hyperlipidemia, and cognitive concerns. By October 2024, the patient was under evaluation by her primary care physician for anxiety and a history of depression. She reported experiencing symptoms of confusion, anxiety, and panic attacks, but denied chest pain, dysphagia, or palpitations. Her adherence to medications ranged from 76% to 100%. Her medical history included a personal history of colonic polyps, with no significant surgical history. During this period, she was taking sertraline (Zoloft) 100 mg and clonazepam 0.5 mg twice daily for anxiety. Her family history included diabetes in her mother and maternal grandmother, dementia in her mother, and cancer in her father. She did not smoke or use tobacco, reported rare alcohol consumption, and described herself as insufficiently active. Her mental status examination revealed some concerns, with a score of 26/30 on the Mini-Mental State Examination (MMSE). In January 2025, the patient was seen by a neurologist due to increasing concerns about her cognitive function. She had been experiencing repetitive questioning and difficulty  maintaining conversations for 6 to 8 months. She also reported anxiety, for which clonazepam provided some relief. Her social history included being recently affected by her daughter preparing to leave home and get . A neurological examination revealed mild cognitive impairment, with a Dexter Cognitive Assessment (MoCA) score of 21/30. An MRI was ordered, which showed mild atrophy and microvascular changes, but no acute pathology. Despite these findings, her symptoms did not clearly match any single neurodegenerative syndrome. By May 2025, the patient continued to experience cognitive issues, although her anxiety had improved with the addition of Abilify. She managed her own medications and had resumed driving short distances. Laboratory results indicated elevated cholesterol levels, but her vitamin B12 levels were adequate with supplementation. The neurologist suspected Alzheimer's disease due to elevated P-tau 181 levels and a family history of dementia. Additional diagnostic tests were recommended, including screening for reversible causes of cognitive impairment and optimizing sleep management due to reported sleep disturbances. In June 2025, the neurologist noted the patients ongoing cognitive impairment, characterized by repeated questioning and difficulty maintaining conversations. Her family history of Alzheimer's disease, along with elevated P-tau 181, supported the potential diagnosis. The patient and her family understood the need for further diagnostic testing, including cerebrospinal fluid (CSF) analysis, to confirm the diagnosis and assess eligibility for anti-amyloid therapy. The patients medications were reviewed, and no immediate safety concerns were identified regarding her home environment or driving abilities. She was documented as full code, and discussions on advanced care planning were deferred. In July 2025, during a psychiatry follow-up, the patient was assessed for recurrent  "depressive disorder in partial remission, generalized anxiety disorder with panic attacks, mild cognitive impairment, and long-term use of antipsychotic medication. Her body mass index (BMI) had increased to 34.62 kg/m². The psychiatrist discussed the risks and benefits of her medication regimen, which included Abilify, Klonopin, and Zoloft, and planned to taper Klonopin to potentially reduce cognitive side effects. The patient was encouraged to attend therapy appointments and continue her current treatment plan, with follow-up scheduled to monitor her condition and adjust medications as needed. Throughout this period, the patients health management focused on optimizing treatment for anxiety and depression, addressing cognitive concerns, and managing elevated cholesterol levels. She was encouraged to maintain physical activity and adhere to her medication regimen. Ongoing assessments were planned to monitor her cognitive function and overall health.      Relevant History of Baseline Neurocognitive Phenotype:    Educational History: HS.  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 They were employed at [employer] for approximately eight years and at [employer] and [employer] for about five years.      Relevant Neurotrauma History:    History of Traumatic Brain Injury: No History of Traumatic Brain Injury or Concussions  History of Brain/Spine Surgery: No History of Iatrogenic Brain Injury or CNS surgery  History of Toxin Exposure/Substance Abuse: No History of Toxin Exposure or Clinically Relevant Substance Abuse  History of Malnutrition/Vitamin Deficiency: No History of Malnutrition or Clinically Relevant Vitamin Deficiency  History of Chronic Mood Disorder/Stress: anxiety youngest daughter is getting  and she is feeling "empty nest syndrome"  History of Chronic Inflammatory State: No History of CNS inflammation or Clinically Relevant Chronic Inflammatory " "State      Relevant Family History:    Relevant General History:  Mother- 88 yo DM  Father- yo CA? and a stroke (unsure of age he passed away and type of CA- will try contacting sister to find out)  daughter- Rosie born 33 yo - "healthy"  daughter- Ewa 25 yo " healthy "  Relevant Neurocognitive Disorder History:  mother- dementia s/s-81 ( passed at 88 yo)  Relevant Movement Disorder History:  maternal uncle ( ) age of s/s and passing unknown.  Known Genetic Profile: There is no relevant genetic testing available on record.      Neurologically Relevant Imaging:    MRI brain/head without contrast performed on 2025  Radiologist Interpretation: Negative contrast-enhanced MRI of the brain for age. No acute process.  Provider Interpretation: The radiographic interpretation indicates a moderate degree of regional atrophy in the occipital lobe, as well as minor atrophy in the anterior cingulate, which are often observed in Lewy body disease. Additionally, there is minimal atrophy of the bilateral hippocampi. This finding suggests a high probability of mixed pathology, including Alzheimer's disease-related pathology.  T1-weighted (T1W) Image Summary: The radiographic interpretation indicates a moderate degree of regional sulcal widening in the occipital lobes. There is very minor atrophy observed in the prefrontal cortex and anterior cingulate, while the corpus callosum appears intact. The midbrain and brainstem are also intact, with normal volume and proportions. There is mild-to-moderate bilateral hippocampal volume loss, more pronounced on the right side compared to the left. 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  FLAIR/T2-weighted (T2W) Image Summary: The radiographic interpretation indicates mild gliotic changes in the hippocampi, more pronounced on the left side than the right. There is no evidence " of lacunar infarcts or large vessel disease.            Differential Diganosis:    LBD/EOAD       This note was dictated using the [employer] Fluency Direct voice recognition program. Please be aware that word recognition errors may occasionally occur and might be overlooked during review.

## 2025-07-31 ENCOUNTER — OFFICE VISIT (OUTPATIENT)
Facility: CLINIC | Age: 60
End: 2025-07-31
Payer: COMMERCIAL

## 2025-07-31 ENCOUNTER — LAB VISIT (OUTPATIENT)
Dept: LAB | Facility: HOSPITAL | Age: 60
End: 2025-07-31
Attending: PSYCHIATRY & NEUROLOGY
Payer: COMMERCIAL

## 2025-07-31 VITALS
BODY MASS INDEX: 34.85 KG/M2 | HEIGHT: 62 IN | DIASTOLIC BLOOD PRESSURE: 69 MMHG | WEIGHT: 189.38 LBS | HEART RATE: 89 BPM | SYSTOLIC BLOOD PRESSURE: 101 MMHG

## 2025-07-31 DIAGNOSIS — G30.9 ALZHEIMER DISEASE: ICD-10-CM

## 2025-07-31 DIAGNOSIS — G31.9 NEURODEGENERATIVE COGNITIVE IMPAIRMENT: ICD-10-CM

## 2025-07-31 DIAGNOSIS — G21.9 SECONDARY PARKINSONISM, UNSPECIFIED SECONDARY PARKINSONISM TYPE: ICD-10-CM

## 2025-07-31 DIAGNOSIS — G31.84 MCI (MILD COGNITIVE IMPAIRMENT): Primary | ICD-10-CM

## 2025-07-31 DIAGNOSIS — G21.19 DRUG-INDUCED PARKINSONISM: ICD-10-CM

## 2025-07-31 DIAGNOSIS — G31.84 MCI (MILD COGNITIVE IMPAIRMENT): ICD-10-CM

## 2025-07-31 DIAGNOSIS — F41.0 PANIC DISORDER: ICD-10-CM

## 2025-07-31 DIAGNOSIS — F33.41 RECURRENT MAJOR DEPRESSIVE DISORDER, IN PARTIAL REMISSION: ICD-10-CM

## 2025-07-31 DIAGNOSIS — F41.1 GENERALIZED ANXIETY DISORDER WITH PANIC ATTACKS: ICD-10-CM

## 2025-07-31 DIAGNOSIS — F02.80 ALZHEIMER DISEASE: ICD-10-CM

## 2025-07-31 DIAGNOSIS — F41.0 GENERALIZED ANXIETY DISORDER WITH PANIC ATTACKS: ICD-10-CM

## 2025-07-31 PROCEDURE — 99999 PR PBB SHADOW E&M-EST. PATIENT-LVL III: CPT | Mod: PBBFAC,,, | Performed by: PSYCHIATRY & NEUROLOGY

## 2025-07-31 PROCEDURE — 86255 FLUORESCENT ANTIBODY SCREEN: CPT | Mod: 59

## 2025-07-31 PROCEDURE — 36415 COLL VENOUS BLD VENIPUNCTURE: CPT

## 2025-07-31 NOTE — PROGRESS NOTES
[facility]  Neurocognitive Assessment      Testing Date: 07/15/2025     Testing performed by: SORAIDA Bach (Neuropsychometrist Assistant)  Supervising Physician: Taran Hernandez MD     Patient underwent standardized neurocognitive testing administered by SORAIDA Bach under the direct supervision of Dr. Taran Hernandez MD.     Tests administered included the [facility] Neurocognitive Battery V.4 which includes but is not limited to : [MMSE, MoCA, Trails A & B, Digit Span, CVLT-I, Pancho-Osterrieth Complex Figure, [place] Naming Test, FAS/COWAT, Semantic Fluency, SYDBAT].        Total time spent on test administration and scorin minutes.     Medical necessity: Neurocognitive testing was medically necessary to evaluate [suspected mild cognitive impairment vs. early Alzheimers disease] to assist in diagnosis, treatment planning, and medical management.

## 2025-07-31 NOTE — PROGRESS NOTES
[facility]  [facility]  Neurocognitive Evaluation Summary    PATIENT: Tammy Yao  VISIT DATE: 2025  MRN: [id number]  PRIMARY PROVIDER: Wilbert Newton MD  : 1965    In response to concerns regarding changes in the patient's memory and daily functioning, I performed a comprehensive neuropsychological interpretation and integration of test data previously obtained on 7/15/2025. This work was conducted on 2025 and included no face-to-face time. The time billed under CPT 52230 reflects only non-face-to-face services, as no direct clinical interaction occurred on this date.    These interpretive services included:  Review of patient history, collateral information, and prior medical records  Analysis of standardized neuropsychological test data, converted to age- and education-adjusted norms  Integration of results across cognitive domains (memory, executive function, visuospatial, language, attention, and social cognition)  Differential diagnostic formulation consistent with amnestic multidomain mild cognitive impairment  Development of recommendations for follow-up care and management  Documentation and report preparation, including a structured summary to be discussed at the patient's upcoming visit.    No time spent on this date was attributable to test administration, scoring, or direct patient interaction. Those services are separately billed under 05598/28729 by the psychometrician. All 35 minutes of billed time reflect interpretive complexity, diagnostic formulation, and treatment planning required to synthesize and communicate findings based on the patient's neurocognitive profile.    Neurocognitive Assessment:     Question Score Interpretation   Memory   Registration T1 (3) 3/3 Within Normal Limits.   Registration T1 (5) 3 Within Normal Limits.   Registration T1 (9) 3/9 Moderate Impairment: -2.3 STDs below the average score based on age and education.    Registration T2 (9) 2/9 Severe Impairment: -6.3 STDs below the average score based on age and education.   Registration T3 (9) 3/9 Severe Impairment: -5.9 STDs below the average score based on age and education.   Registration T4 (9) 3/9 Severe Impairment: -6.9 STDs below the average score based on age and education.   Registration T5 (9) 3/9 Severe Impairment: -6.9 STDs below the average score based on age and education.   Delayed Recall 30 sec (9) 3/9 Severe Impairment: -3.7 STDs below the average score based on age and education.   Delayed Recall Cued (9) 3/9 Severe Impairment: -4.3 STDs below the average score based on age and education.   Delayed Recognition (9) 5/9 Severe Impairment: -7.4 STDs below the average score based on age and education.   Executive   Three-step command 3/3 Within Normal Limits.   Serial Sevens 3/3 Within Normal Limits.   WORLD Backward 5/5 Within Normal Limits.   Digit Span Backwards 3 Mild Impairment: -1.8 STDs below the average score based on age and education.   Digit Span - 2 2/2 Within Normal Limits.   Fluency 1/1 Within Normal Limits.   Lexical Fluency - F 10 Mild Impairment: -1.3 STDs below the average score based on age and education.   Lexical Fluency - A 5 Moderate Impairment: -2.6 STDs below the average score based on age and education.   Lexical Fluency - S 10 Mild Impairment: -1.3 STDs below the average score based on age and education.   Semantic Fluency - Animals 9 Moderate Impairment: -2.9 STDs below the average score based on age and education.   Semantic Fluency - Vegetables 8 Severe Impairment: -3.1 STDs below the average score based on age and education.   Trials-A 100/120 Borderline Impairment based on age and education.   Trials-B 45/120 Mild Impairment based on age and education.   Trials-1 1/1 Within Normal Limits.   Visuospatial   Clock Draw 2/3 Borderline Impairment based on age and education.   Complex Figure Copy 4/17 Severe Impairment: -12.8 STDs below  the average score based on age and education.   Overlap Images Total 10/10 Within Normal Limits.   Overlap Images (Illusion) 9 Within Normal Limits.   Picture Synthesis 3/3 Within Normal Limits.   Pareidolia Total 20/20 Within Normal Limits.   Pareidolia (+Face) 8/10 Borderline Impairment based on age and education.   Pareidolia (+No Face) 1/10 Moderate Impairment based on age and education.   Language   Naming-2 2/2 Within Normal Limits.   Naming-3 3/3 Within Normal Limits.   15-Item BNT 12/15 Moderate Impairment: -2.8 STDs below the average score based on age and education.   Repetition-1 1/1 Within Normal Limits.   Repetition-2 2/2 Within Normal Limits.   Repetition of Phrases 5/5 Within Normal Limits.   Verbal Agility 6/6 Within Normal Limits.   Following written command 1/1 Within Normal Limits.   Writing a complete sentence 1/1 Within Normal Limits.   Repeat & Point - Semantics 8/10 Borderline Impairment based on age and education.   Abstraction 2/2 Within Normal Limits.   Attention   Orientation-10 9/10 Borderline Impairment based on age and education.   Orientation-6 5/6 Borderline Impairment based on age and education.   Digit Span Forwards 6 Mild Impairment: -1.3 STDs below the average score based on age and education.   Alternating Sequence 1/1 Within Normal Limits.   Social   FTLD Questionnaire  20/20 Within Normal Limits.   Theory of Mind Cartoon 1/1 Within Normal Limits.   Motor/Sensory   Limb-Kinetic Apraxia 10/10 Within Normal Limits.     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  Memory predominant multidomain major cognitive impairment.  Severe Memory Impairment: The patient scored >2 standard deviations below the norm on at least one measure. Impairment appreciated in immediate recall, recall, delayed recall, cued recall,  recognition  Severe Visuospatial Impairment: The patient scored >2 standard deviations below the norm on at least one measure. Impairment appreciated in visuospatial apraxia, pareidolia  Severe Executive Impairment: The patient scored >2 standard deviations below the norm on at least one measure. Impairment appreciated in encoding, working memory, lexical fluency, semantic fluency, set-shifting  Mild Language Impairment: The patient scored >2 standard deviations below the norm on at least one measure. Impairment appreciated in naming, semantic  Mild Attention Impairment: The patient scored >2 standard deviations below the norm on at least one measure. Impairment appreciated in attention, orientation, verbal STM  Normal Social Function  Normal Motor/Sensory Function  Assessment completed in July 2025  MMSE 25/30: Score suggestive of mild cognitive impairment.  MOCA 22/30: Score suggestive of mild cognitive impairment.      Signing Physician:  Taran Hernandez MD

## 2025-07-31 NOTE — PROGRESS NOTES
[facility]  Brain Health and Cognitive Disorders Program     PATIENT: Tammy Yao  VISIT DATE: 2025  MRN: [id number]  PRIMARY PROVIDER: Wilbert Newton MD  : 1965    Assessment:     The patient is a 60-year-old with a relevant past medical history of KWADWO/MDD, HLD, KWADWO and panic disorder who presents reporting a 3-year history of fluctuating neurocognitive impairment.     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  Memory predominant multidomain major cognitive impairment.  Severe Memory Impairment: The patient scored >2 standard deviations below the norm on at least one measure. Impairment appreciated in immediate recall, recall, delayed recall, cued recall, recognition  Severe Visuospatial Impairment: The patient scored >2 standard deviations below the norm on at least one measure. Impairment appreciated in visuospatial apraxia, pareidolia  Severe Executive Impairment: The patient scored >2 standard deviations below the norm on at least one measure. Impairment appreciated in encoding, working memory, lexical fluency, semantic fluency, set-shifting  Mild Language Impairment: The patient scored >2 standard deviations below the norm on at least one measure. Impairment appreciated in naming, semantic  Mild Attention Impairment: The patient scored >2 standard deviations below the norm on at least one measure. Impairment appreciated in attention, orientation, verbal STM  Normal Social Function  Normal Motor/Sensory Function  Assessment completed in 2025  MMSE 25/30: Score suggestive of mild cognitive impairment.  MOCA 22/30: Score suggestive of mild cognitive impairment.    The stage of the patient's clinical presentation meets the criteria for Mild Cognitive Impairment (MCI) as defined by the [place] [employer] [place]-Alzheimer's  Association(Og et al., 2011, Alzheimer's & Dementia). This diagnosis is characterized by concerns regarding an intraindividual change in cognition, impairment in one or more cognitive domains, and preservation of independence in functional abilities. Notably, the patient is not demented.   Global Clinical Dementia Rating (CDR): 0.5/3 suggestive of Questionable cognitive impairment.  Clinical Dementia Rating Sum of Boxes (CDR-SOB): 2.5/18 suggestive of Normal.  [facility] Functional Assessment Scale (FAS): 9/30 suggestive of Definite Functional Impairment.  Functional Assessment Staging Tool (FAST Scale): 2/16 suggestive of Possible mild Cognitive impairment (Stage 2).  Carlock-Thierno Instrumental Activities of Daily Living Scale: 1/8 suggestive of Normal.     The patient's clinical syndromic presentation is consistent with Alzheimer's Disease related Cognitive Impairment (Ninfa et al. 2011; Curtis et al., 2024).  Meets criteria for cognitive impairment without dementia.  Insidious onset over months to years.  Clear-cut history of worsening cognition by report or observation.  Amnestic presentation: impairment in learning and recall.       At present, all neurodegenerative diseases can only be diagnosed with 100% certainty through a brain autopsy. The suspected neuropathology underlying the patient's neurocognitive impairment is suggestive of Alzheimer's Disease Related Pathology (ADRP) and Lewy body disease/alpha-synucleinopathy (LBD).  Serum fluid protein biomarkers include Phospho-Tau (181P) Serum: 1.71 (H) on 01/09/2025 1.53 (H) on 07/01/2025 Neurofilament Light Chain: 11.6 (N) on 01/09/2025  There are no cerebrospinal fluid protein biomarkers available on record.  There are no dermatological protein biomarkers available on record.  There is no relevant genetic biomarkers available on record.  Neurological Radiographic biomarkers include  MRI brain/head without contrast performed on 02/06/2025  The radiographic  interpretation indicates a moderate degree of regional atrophy in the occipital lobe, as well as minor atrophy in the anterior cingulate, which are often observed in Lewy body disease. Additionally, there is minimal atrophy of the bilateral hippocampi. This finding suggests a high probability of mixed pathology, including Alzheimer's disease-related pathology.            Impression:     The patient has a three-year history of progressive cognitive impairment that began in 2022. Initially, in early 2022, her anxiety and depression worsened following significant psychosocial stress due to the death of her mother. These symptoms were managed with medications, including Rexulti. By early 2023, she began experiencing medication-induced tremors, which necessitated the discontinuation of Rexulti. This change was followed by an increase in anxiety and repetitive speech. As the cognitive symptoms progressed, she experienced notable difficulties with memory and orientation by mid-2024, requiring frequent prompting from family members. By early 2025, her Dexter Cognitive Assessment (MoCA) score had declined to 21 out of 30, and her MRI showed mild atrophy and microvascular changes, indicative of mild cognitive impairment. Further decline was observed, with persistent memory deficits and repetitive questioning. Elevated P-tau 181 levels raised suspicion for Alzheimer's disease. Neurological examination revealed deficits in memory, executive function, and visuospatial abilities, along with mild impairments in language and attention. The presence of asymmetric parkinsonism and a family history of dementia further support a neurodegenerative process. 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 We discussed the diagnosis, and the patient is still considered a  candidate for anti-amyloid therapy. Additional diagnostic testing in the form of a lumbar puncture is recommended, and both the patient and family are in agreement. They expressed concerns regarding polypharmacy and drug-induced parkinsonism. The patient also has multiple prodromal symptoms suggestive of synuclein disease. Confirmatory testing with a skin biopsy is recommended, and the patient and family are in agreement.    The above observations were discussed with the patient and their supporting historian(s). We have discussed the additional diagnostic(s) and/or management below.            Care Management Plan:     Diagnostic Screening for measurable forms of neurodegenerative pathology.  We have discussed opportunities for biomarker testing (CSF Goel biomarkers, IDEAs Amyloid-PET, Syn-One skin biopsy).  We scheduled a lumbar puncture for assessment of Goel CSF ADRP Biomarkers.  We scheduled a skin biopsy for assessment of Syn-One alpha-synuclein related pathology.  Optimize Neurocognitive Impairment and Quality  We have discussed and/or provided information regarding Cognitive Rehabilitation Strategies Techniques such as mnemonic devices and external memory aids, such as calendars and reminder notes, to support memory function. Recommend problem-solving exercises and tasks that promote organizational skills to strengthen executive functioning.  We have discussed the MIND Diet and other lifestyle behaviors that may help maintain brain health.  We have provided written/digital reading material.  Next appointment consider the addition of donepezil  Continue B12 1000 mcg daily  Optimize Behavioral Management and Quality  We have discussed the value of behavioral interventions e.g. structured daily routines and engage the patient in cognitively stimulating activities to mitigate symptoms of agitation and enhance mood stability.  We have discussed the potential benefits and risks of medications aimed at managing  neuropsychiatric symptoms, ensuring alignment with the patient's overall health status.  Continue Zoloft 100 mg daily  Continue Klonopin 0.50.25 mg p.r.n.  Continue Abilify 2 mg daily may consider alternatives following confirmation of synuclein disease  Optimize Cerebrovascular Health.  The patient has a documented history of hyperlipidemia and/or hypercholesteremia with long-term complications such as cerebrovascular disease, peripheral vascular disease, and/or aortic atherosclerosis. Collectively these risk factors may contribute to cerebral atherosclerosis, and cerebral hypoperfusion compounded neurocognitive disorder. We discussed maximizing cerebrovascular-related medical therapy, including but not limited to cholesterol medications and antiplatelet agents. We have discussed the value of aggressively controlling vascular risk factors like hypertension, hyperlipidemia, and Diabetes SBP<130, LDL<100, and A1C<7.0. We discussed the need to optimize lifestyle choices, including a heart-healthy diet (e.g., Mediterranean or DASH), increased cardiovascular exercise (goal 150 minutes of moderate-intensity per week), and staying cognitively and socially active.  Optimize Sleep Hygiene and Quality  We discussed and recommended additional diagnostic/management of sleep disorder to optimize brain health and longevity.  We have placed referrals to sleep medicine due to signs and symptoms suggestive of sleep apnea.     The care plan was developed collaboratively with the patient and caregiver, addressing their goals of maintaining functional independence while planning for potential disease progression.     Thank you for entrusting us with the care of your patient. Should you have any questions or concerns, please feel free to contact us at your convenience.            Recent Clinical History:    Chief Complaint: Progressive Cognitive Impairment.    Clinical Summary: The patient is a 60-year-old right-handed woman with a  medical history that includes depression, anxiety, and hyperlipidemia. She reports a significant change in her mental health following the death of her mother in early 2022. During this period, she experienced increased anxiety and began taking Rexulti, having previously been treated with Lexapro. By early 2023, she developed new-onset tremors, which led to the discontinuation of Rexulti. Her anxiety significantly worsened by March 2024, accompanied by increasing episodes of repetitive speech. By June 2024, she began experiencing difficulty remembering names, and by September 2024, her family observed that she was less cognitively sharp and required repeated prompting. In October 2024, the patient was evaluated by her primary care physician due to confusion and anxiety. She reported experiencing panic attacks but denied any chest pain, difficulty swallowing, or palpitations. Her medication regimen included sertraline (Zoloft) 100 mg and clonazepam 0.5 mg twice daily. She was compliant with her treatment, and her social history indicated rare alcohol use and no tobacco use. Her family history is notable for dementia in her mother. Cognitive testing at the time revealed an MMSE score of 26/30, raising suspicion for early dementia and prompting a referral to a neurologist. By January 2025, at age 59, she underwent a neurological evaluation due to worsening cognitive concerns characterized by repetitive questioning and difficulty sustaining conversations, with symptoms present for six to eight months. Her MoCA score was 21/30, and previous MMSE scores ranged between 24/30 and 26/30. Anxiety persisted despite the use of clonazepam, with psychosocial stressors including her daughters upcoming marriage. MRI imaging showed mild atrophy and microvascular changes without acute pathology. Clinical findings were suggestive of mild cognitive impairment, although they did not yet fit a definitive neurodegenerative pattern. In  May 2025, at age 60, the patient continued to demonstrate cognitive decline, with persistent repetitive questioning and reduced engagement in conversations. Her anxiety improved with the addition of Abilify. She remained capable of managing her medications independently and drove short distances. Laboratory studies revealed elevated cholesterol, while her vitamin B12 levels were normal with supplementation. Considering her family history of dementia and new laboratory evidence of elevated P-tau 181, Alzheimer's disease was suspected. Recommendations included screening for reversible causes of cognitive impairment and optimizing sleep hygiene. By June 2025, the patients short-term memory deficits persisted. The elevated P-tau 181 levels, along with her clinical presentation and maternal history of dementia, reinforced concerns about Alzheimer's disease. The neurologist discussed the need for cerebrospinal fluid analysis to confirm the diagnosis and assess her suitability for anti-amyloid therapy. No immediate safety risks were identified regarding her home environment or driving, and she remained documented as full code. Discussions about advanced care planning were postponed. In July 2025, during a psychiatric follow-up, the patient was diagnosed with mild cognitive impairment in the context of recurrent depressive disorder in partial remission and generalized anxiety disorder with panic attacks. She continued on Abilify, Klonopin, and Zoloft, with plans to taper Klonopin to mitigate potential cognitive side effects.    Clinical Interim: The patient is here today with their daughter and grandson for a new intake evaluation with the neurocognitive program scheduled for late July 2025. The patient has a longstanding history of mood disorder, previously managed with various medications, including Ativan, Zoloft, Lexapro, Rexulti, and Abilify. A noticeable decline in the patient's condition appeared to occur abruptly  following significant psychosocial stressors, including the death of her mother in 2022, for whom she was the primary caregiver during the later stages of dementia. Following these events, the patient was treated with multiple medications, which resulted in tardive dyskinesia and drug-induced parkinsonism. However, during this same period, both mood and cognition worsened. Over the past three years, there has been a gradual decline in mood and cognitive abilities, leading to borderline interference in the patient's instrumental activities of daily living. Serial cognitive testing has shown a progressive decline in cognitive function, and serum biomarkers raise concerns about the early stages of an Alzheimer's disease-related process. The patient does not have a strong family history of dementia, although the biomarkers suggest early signs of neurodegeneration. We have discussed these concerns with the patient's family. Based on recent cognitive testing and brain imaging, the patient is considered a candidate for anti-amyloid therapy. We also discussed the potential value of additional diagnostic testing, such as a lumbar puncture. Other notable findings include asymmetric parkinsonism. 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 We discussed the potential value of conducting a skin biopsy. The patient and their family are in agreement with the proposed care plan. We also expressed concerns regarding polypharmacy, specifically the use of Abilify 2 mg and Klonopin 0.25 mg. We will revisit this issue once we have a confirmed diagnosis.    Medication Evaluation: A comprehensive review and reconciliation of the patient's current medications were performed. Medications were assessed  for potential cognitive effects, interactions, and appropriateness. discussed LTM use of benzodiazepines and cognitive changes.  Safety Evaluation: The patient's safety was evaluated, including an assessment of the home environment, risk of falls, and other potential hazards. Motor vehicle operation was also assessed, and no safety concerns were identified at this time. No immediate recommendations or modifications were deemed necessary.  Caregiver Evaluation: The patient's primary caregiver was identified, and their knowledge, needs, and ability to provide care were evaluated. The caregiver denies needing assistance at this time but was provided with education and support resources for future reference, including requisite referrals as needed.  Advanced Care: The patient is currently documented as Full Code, meaning they have expressed a preference for all possible life-sustaining treatments to be administered in the event of a medical emergency, such as cardiopulmonary resuscitation (CPR), intubation, and mechanical ventilation. Further discussion regarding advance care planning was not conducted at this time.          Relevant History of Baseline Neurocognitive Phenotype:    Developmental Milestones: The patient/family report no known birth complications or early life problems. The patient met all developmental milestones.  Learning Neurodivergence: The patient/family report no signs or symptoms suggestive of developmental learning disorder.  Educational History: HS.  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 They were employed at [employer] for approximately eight years and at [employer] for around eight to ten years. The patient retired after having children and officially retired in 1998.  Vocational Neurocompatibility: [employer] Career: 5 -  "Enterprising  Retired:       Relevant Neurotrauma History:    History of Traumatic Brain Injury: No History of Traumatic Brain Injury or Concussions  History of Brain/Spine Surgery: No History of Iatrogenic Brain Injury or CNS surgery  History of Toxin Exposure/Substance Abuse: No History of Toxin Exposure or Clinically Relevant Substance Abuse  History of Malnutrition/Vitamin Deficiency: No History of Malnutrition or Clinically Relevant Vitamin Deficiency  History of Chronic Mood Disorder/Stress: anxiety youngest daughter is getting  and she is feeling "empty nest syndrome"  History of Chronic Inflammatory State: No History of CNS inflammation or Clinically Relevant Chronic Inflammatory State      Relevant Family History:    Relevant General History:  Mother-  88 yo DM2  Father-  72 Lung CA/CVA  Daughter- Rosie born 35 yo - "healthy"  Daughter- Ewa 27 yo "healthy"  Relevant Neurocognitive Disorder History:  Mother - LOAD onset 81  89  Relevant Movement Disorder History:  MUncle - Parkinson onset late 60s  late 70s  Relevant Motor Neuron Disease History:  The patient/family denies a history of ALS, MND, PLS.  Relevant Developmental/Neurodivergent History:  The patient/family denies a history of Dyslexia, ADHD, ASD.  Relevant Psychiatric History:  Mother - MDD/KWADWO  Known Genetic Profile: There is no relevant genetic testing available on record.            Clinical History Per Electronic Medical Record:    Past Medical History  Personal history of colonic polyps  MCI (mild cognitive impairment)  Hyperlipidemia  Generalized anxiety disorder with panic attacks  Transaminitis  Recurrent major depressive disorder, in partial remission  Past Surgical History  Appendectomy  Cholecystectomy  Hernia Repair  Hysterectomy  Joint Replacement  Coronary Artery Bypass Graft  Carpal Tunnel Release  Tonsillectomy  Thyroidectomy  Cataract Surgery  Current Medication(s) Prior to " Appointment  Aripiprazole 2 mg once daily  Clonazepam 0.25 mg once daily  Cyanocobalamin 1000 mcg once daily  Sertraline 200 mg once daily            Review of cognitive, visuospatial, motor, sensory, and behavioral systems:     Memory  The patient's memory has worsened relative to their baseline. Comment: she and her  feel memory is unchanged from baseline.  The patient does repeat statements or asks the same question repeatedly.  The patient does have difficulty remembering recent important conversations.  The patient does not forget information within minutes.  the patient denies new difficulty with recall events with high fidelity/accuracy.  The patient does not have difficulty remembering recent events.  The patient's recent retrograde memory is intact.  The patient's remote memory is intact.  Attention  The patient's attention and concentration are impaired.  The patient does have attentional fluctuations.  The patient does have difficulty with selective attention.  The patient does become easily distracted.  The patient does have difficulty with divided attention.  Executive  The patient's cognitive ability to process and respond to information has slowed.  The patient's cognitive ability to process and respond to information has not slowed significantly .  The patient's cognitive ability to manage time is not effected.  The patient's cognitive ability to manage time is not severely effected.  The patient does have difficulty with working memory such as occasional reliance on external aids like notes.  The patient does misplace personal items (e.g., keys, cell phone, wallet) more frequently.  The patient does not have a significant degree of working memory impairment.  The patient does have difficulty with Goal-Directed Behavior.  The patient does have difficulty keeping track of their medications.  The patient is not having major difficulty keeping track of medications independently.  The patient does  have difficulty with planning/organizing/completing multistep tasks requires assistance from others.  The patient does have not difficulty with multitasking/multistep tasks.  The patient does not have difficulty with recognizing and adjusting for mistakes.  The patient has not shown to have difficulty with judgement.  The patient does not have difficulty understanding abstract concepts or relationship.  The patient's insight into their health and situation is intact.  Language  The patient's speech has been not affected.  The patient's speech is fluent and non-effortful.  The patient does forget places/people's names more frequently.  The patient does not have word-finding difficulties.  The patient does not make word substitutions.  The patient's speech is grammatically intact.  The patient does not appear to have impaired sentence fluency, repetition,and phonological processing.  The patient appears to have impaired comprehension.  The patient does not appear to have difficulty comprehending and understanding written text.  Visuospatial  The patient does not become confused or disoriented in new, unfamiliar places.  The patient does not have trouble with navigation.  The patient does not get lost in familiar places.  The patient does not have visuospatial disorientation.  The patient denies problems with driving or parking.  The patient does not have difficulty recognizing objects or faces.  Motor/Coordination  The patient does not have difficulty with walking.  The patient does not feel imbalanced.  The patient denies having fallen.  The patient does not appear to have new motor deficits.  The patient does not have difficulty buttoning shirts, operating zippers, or manipulating tools/utensils.  The patient denies new slow, small dysrhythmic movement.  The patient does have a resting tremor.  The patient's handwriting has not become micrographic.  The patient denies restlessness.  The patient denies new and/or  worsening simple repetitive behaviors.  The patient denies a change of self-stimulating behavior.  The patient's speech has not become simplified or become repetitive/stereotyped.  The patient denies having any new involuntary movements and/or muscle jerking.  The patient denies new muscle cramps and twitching.  The patient does not have swallowing difficulty.  Sensory  The patient denies new numbness, tingling, paresthesias, or pain.  The patient denies a loss of vision, blurry vision, or double vision.  The patient denies new loss of hearing or worsening tinnitus.  The patient denies anosmia.  The patient does not have hypersensitivity to environmental stimuli.  The patient does not have severe hypersensitivity to environmental stimuli.  Sleep  The patient reports difficulty sleeping. Comment: She goes to bed [id number] she falls asleep within fifteen minutes. She does not wake up during the night. wakes up between [id number] daily.  The patient does have difficulty going to sleep.  The patient denies difficulty staying asleep or frequently awakening at night.  The patient reports snoring.  The patient does not snore or have witnessed apneas while sleeping. Comment: no previous evaluation for sleep apnea.  When the patient wakes up in the morning, the patient does not feel well-rested. Comment: falls asleep 10-15 minutes at least once during the day while watching tv.  The patient denies dream-enactment behavior.  The patient denies per-nocturnal jerking.  The patient denies symptoms suggestive of restless leg syndrome.  Neurobehavioral  The patient's personality has changed.  The patient does note have difficulty with self-control.  The patient denies new impulsivity or rash/careless actions.  The patient does not have difficulty with Decision-Making  The patient does appear to have had a change in behavioral/emotional inertia.  The patient does not appear apathetic or has decreased motivation.  The patient does  not have difficulty with understanding and responding to social cues.  The patient is not exhibiting a diminished response to other people's needs and feelings.  The patient is exhibiting symptoms of social withdrawal/indifference.  The patient is not exhibiting a diminished social interest, interrelatedness, or personal warmth.  The patient does not have symptoms to suggest a loss of manners or decorum.  The patient does not have symptoms of disinhibition and social inappropriateness.  The patient's personal hygiene is intact.  The patient does not have difficulty with flexible thinking.  The patient denies any change in rigid behavior.  The patient does not have symptoms of hyper-religiosity or dogmatism.  The patient's interests/pleasures have not become restrictive, simplified, interrupting, or repetitive.  The patient has not shown new perseverative behavior.  The patient denies new/worsening complex repetitive/ritualistic compulsions and behaviors.  The patient denies any changes in eating behavior.  The patient denies increased consumption of food or substances.  The patient denies oral exploration or consumption of inedible objects.  Psychiatric  The patient does have difficulty with managing emotional responses effectively.  The patient does not have symptoms of irritability and mood lability.  The patient does not exhibit hyperactive behavior.  The patient does not have symptoms of agitation, aggression, or violent outbursts.  The patient's emotional expression has changed.  The patient does not have emotional blunting.  The patient does feel depressed. Comment: improved on aripiprazole.  The patient does have anxiety. Comment: improved with clonazepam.  The patient does not exhibit cycling behavior.  The patient is not exhibited symptoms of paranoia.  The patient does not have delusions.  The patient does not have hallucinations.  Medical Review of Systems  The patient does not have constipation.  The  patient does not have diarrhea.  The patient does not have urinary incontinence.  The patient denies orthostatic lightheadedness.  The patient's weight is unstable. Comment: gained 15-20 lbs [id number]  The patient does not have a history of sensitivity to neuroleptic/psychotropic medications.            Functional Capacity Assessment: Neurological Wellbeing, Intelligence, and Social Evaluation:     Instrumental Activities of Daily Living:   Communal Operations: Mild level of inability to perform independantly.  Finances: Severe level of inability to perform independantly.  Housework: Normal level of functional independance.  Personal Care: Borderline level of inability to perform independantly.  Personal Health: Normal level of functional independance.  Recreation: Mild level of inability to perform independantly.  Transportation: Mild level of inability to perform independantly.  Basic Activities of Daily Living:   Axial Motor: Normal level of functional independance.  Grooming: Normal level of functional independance.  Hygeine: Normal level of functional independance.  Oropharngeal Motor: Normal level of functional independance.  Personal Health: Normal level of functional independance.  Toiletry: Normal level of functional independance.  Functional Capacity Scales:   IADL: Score 1/8 suggestive of Normal.  FAST: Score 2/16 suggestive of Possible mild Cognitive impairment (Stage 2).  FAS: Score 9/30 suggestive of Definite Functional Impairment.  CDR-SOB: Score 2.5/18 suggestive of Normal.  Global CDR: Score 0.5/3 suggestive of Questionable cognitive impairment.  Memory: 1  Orientation: 0  Judgment & Problem Solvin.5  Community Affairs: 0.5  Home and Hobbies: 0.5  Personal Care: 0            Neurological Examination:     Mental Status  The patient's appearance was abnormal.  Throughout the interview, the patient behaved abnormally and was not cooperative.  The patient behavior was inappropriate to the clinical  context and situation.  The patient behavior was not characterized by episodes of sudden uncontrollable and inappropriate laughing or crying.  The patient's energy level is abnormal.  The patient's orientation is not entirely accurate.  The patient's attention/concentration is impaired.  The patient is unable to complete three-step commands without making errors.  The patient fund of knowledge was appropriate for age, culture, and level of education.  The patient's thought process is not logical or goal-oriented.  The patient demonstrated impaired insight based on actions, awareness of the patient's illness, plans for the future.  The patient demonstrated good judgment based on actions and plans for the future.  Cranial Nerves  The patient showed no evidence of anosmia 3/3 (coffee/vanilla/cinnamon).  The patient's pupils were normal.  The patient's visual fields were full to confrontation in all quadrants.  The patient's ocular pursuit in the horizontal and vertical plane was complete.  The patient's saccadic initiation, velocity, and amplitude are normal.  The patient demonstrated no square-wave jerks.  The patient's eyelid assessment showed no apraxia. There was no eyelid dysfunction, retraction, or weiner sign.  The patient blink rate was abnormal.  The patient's facial strength was normal.  The patient's facial expression was symmetric and appropriate to the context.  The patient's facial sensation was intact to light touch bilaterally.  The patient's hearing was normal bilaterally.  The patient's oropharynx was non-obstructed with a Mallapati score 1/4. The soft palate elevates symmetrically.  The patient's uvula is mid-line.  The patient's tongue showed no evidence of scalloping.  The patient can protrude their tongue beyond The patient's lips for >10 sec.  The patient can move their extended tongue back and forth rapidly.  The patient's tongue showed no evidence of fasciculation or scalloping.  The patient's  "sternocleidomastoid and trapezius muscle strength was full bilaterally.  The patient had no significant evidence of anterocollis or retrocollis.  Speech/Language  The patient's speech was not completely fluent and non-effortful.  The patient's speech timbre is abnormal.  The patient's speech rate is normal.  The patient's respirations are within normal range and appropriate to context.  The patient's speech timbre is normal.  The patient has no articulation (segmental features) errors.  The patient has no speech dysdiadochokinesia with repetition of syllables such as "/PA/, /TA/, /KA/, /OM/".  The patient's pitch assessment showed normal range, intonation (Pitch Pattern), and variability.  The patient's tone was emotionally limited, and the patient's tempo was arrhythmic.  The patient's speech is not dysarthric.  The patient's speech was without evidence of anomia.  The patient makes no phonological loop errors.  The patient's speech is grammatically intact; (no function/semantic word substitutions, phonemic/semantic paraphasias, or binary confusion).  Motor  The patient's bilateral upper extremity muscle bulk is appropriate.  The patient's upper extremity muscle tone is increased.  The patient's bilateral upper extremity muscle tone does not suggest spasticity.  There is evidence of rigidity/cogwheeling.  The patient's bilateral upper extremity muscle tone has no evidence of paratonia.  There was no dystonia observed on examination.  Assessment of motor strength was symmetric and at minimal anti-gravity.  Deltoid L +5/5 R +5/5 Biceps L +5/5 R +5/5 Triceps L +5/5 R +5/5 Wrist extension L +5/5 R +5/5 Finger abduction L +5/5 R +5/5 Hip flexion L +5/5 R +5/5 Hip extension L +5/5 R +5/5 Knee flexion L +5/5 R +5/5 Knee extension L +5/5 R +5/5 Ankle flexion L +5/5 R +5/5 Ankle extension L +5/5 R +5/5  There is no pronator or downward drift.  There is no myoclonus observed in the patient bilateral upper and lower " extremities.  There are no fasciculations observed in the patient bilateral upper and lower extremities.  Coordination  The patient has no bilateral upper extremity limb dysmetria or past pointing on finger-nose-finger bilaterally.  The patient has no limb dysdiadochokinesia of the upper extremity on the pronation/supination test and screwing in a light bulb or lower extremity during tapping ball of each foot bilaterally.  The patient has no cerebellar rebound bilaterally.  The patient has no visible tremor.  The patient has no kinetic tremor bilaterally.  The patient has a postural tremor.  The patient has no resting tremor bilaterally.  The patient has evidence of interhemispheric motor control deficits.  The patient demonstrates evidence of motor overflow.  The patient demonstrates no alien limb phenomena.  The patient has no dyskinetic movements.  The patient has no akathisia.  The patient's upper extremity fine motor coordination was abnormal.  Higher Cortical Function  The patient had no hemineglect (e.g., line bisection/Stephen's test).  The patient showed no evidence of simultanagnosia (Navon hierarchical letters).  The patient showed no evidence of visuospatial constructional dysfunction.  The patient showed no evidence of angular gyrus disconnection (insular-operculum).  The patient has no evidence of dysgraphia.  The patient showed no evidence of apraxia.  The patient showed no dysexecutive behavior.  Sensory  The patient's cortical sensory assessment demonstrated no neglect bilaterally.  The patient's sensation was intact to light touch, and vibratory sense in the bilateral upper and lower extremities.  Reflexes  Reflexes were symmetric and 2+ at biceps, 2+ triceps, and 2+ brachioradialis, 2+ at the knees bilaterally, there was no cross-abductor sign, 2+ in the bilateral ankles.  There were no pathological reflexes; No Babinski, bilateral plantar toes go down, no Jaw Jerk Reflex, No Carranza, No  Palmomental reflex, and No Palmar Grasp reflex.  There was no spreading/clonus.  Gait  The patient has normal posture sitting unaided.  The patient can arise from a chair and sit back down without using their arms.  The patient's gait was abnormal.  The patient's stance while walking is abnormal.  The patient's gait speed (6 meters) was normal (70-80 F 1.13 m/s M 1.26 m/s, >80 F 0.94 m/sec, M 0.97 m/sec).  The patient's arms swing is abnormal.  The patient takes turns in >4 steps.  When attempting to walk abnormally (heels, tiptoes, tandem), the patient makes no errors.  The patient has no evidence of posture/balance impairment.            Neurocognitive Assessment:     Question Score Interpretation   Memory   Registration T1 (3) 3/3 Within Normal Limits.   Registration T1 (5) 3 Within Normal Limits.   Registration T1 (9) 3/9 Moderate Impairment: -2.3 STDs below the average score based on age and education.   Registration T2 (9) 2/9 Severe Impairment: -6.3 STDs below the average score based on age and education.   Registration T3 (9) 3/9 Severe Impairment: -5.9 STDs below the average score based on age and education.   Registration T4 (9) 3/9 Severe Impairment: -6.9 STDs below the average score based on age and education.   Registration T5 (9) 3/9 Severe Impairment: -6.9 STDs below the average score based on age and education.   Delayed Recall 30 sec (9) 3/9 Severe Impairment: -3.7 STDs below the average score based on age and education.   Delayed Recall Cued (9) 3/9 Severe Impairment: -4.3 STDs below the average score based on age and education.   Delayed Recognition (9) 5/9 Severe Impairment: -7.4 STDs below the average score based on age and education.   Executive   Three-step command 3/3 Within Normal Limits.   Serial Sevens 3/3 Within Normal Limits.   WORLD Backward 5/5 Within Normal Limits.   Digit Span Backwards 3 Mild Impairment: -1.8 STDs below the average score based on age and education.   Digit Span - 2  2/2 Within Normal Limits.   Fluency 1/1 Within Normal Limits.   Lexical Fluency - F 10 Mild Impairment: -1.3 STDs below the average score based on age and education.   Lexical Fluency - A 5 Moderate Impairment: -2.6 STDs below the average score based on age and education.   Lexical Fluency - S 10 Mild Impairment: -1.3 STDs below the average score based on age and education.   Semantic Fluency - Animals 9 Moderate Impairment: -2.9 STDs below the average score based on age and education.   Semantic Fluency - Vegetables 8 Severe Impairment: -3.1 STDs below the average score based on age and education.   Trials-A 100/120 Borderline Impairment based on age and education.   Trials-B 45/120 Mild Impairment based on age and education.   Trials-1 1/1 Within Normal Limits.   Visuospatial   Clock Draw 2/3 Borderline Impairment based on age and education.   Complex Figure Copy 4/17 Severe Impairment: -12.8 STDs below the average score based on age and education.   Overlap Images Total 10/10 Within Normal Limits.   Overlap Images (Illusion) 9 Within Normal Limits.   Picture Synthesis 3/3 Within Normal Limits.   Pareidolia Total 20/20 Within Normal Limits.   Pareidolia (+Face) 8/10 Borderline Impairment based on age and education.   Pareidolia (+No Face) 1/10 Moderate Impairment based on age and education.   Language   Naming-2 2/2 Within Normal Limits.   Naming-3 3/3 Within Normal Limits.   15-Item BNT 12/15 Moderate Impairment: -2.8 STDs below the average score based on age and education.   Repetition-1 1/1 Within Normal Limits.   Repetition-2 2/2 Within Normal Limits.   Repetition of Phrases 5/5 Within Normal Limits.   Verbal Agility 6/6 Within Normal Limits.   Following written command 1/1 Within Normal Limits.   Writing a complete sentence 1/1 Within Normal Limits.   Repeat & Point - Semantics 8/10 Borderline Impairment based on age and education.   Abstraction 2/2 Within Normal Limits.   Attention   Orientation-10 9/10  Borderline Impairment based on age and education.   Orientation-6 5/6 Borderline Impairment based on age and education.   Digit Span Forwards 6 Mild Impairment: -1.3 STDs below the average score based on age and education.   Alternating Sequence 1/1 Within Normal Limits.   Social   FTLD Questionnaire  20/20 Within Normal Limits.   Theory of Mind Cartoon 1/1 Within Normal Limits.   Motor/Sensory   Limb-Kinetic Apraxia 10/10 Within Normal Limits.     Clinical Impression of Neurocognitive Assessment: Memory predominant multidomain major cognitive impairment.          Laboratories:     Hematology Screening - Serum  Name Reference Previous Values   WBC 3.90 - 12.70 K/uL 8.73   2025-01-09      RBC 4.00 - 5.40 M/uL 4.65   2025-01-09      Hemoglobin 12.0 - 16.0 g/dL 13.6   2025-01-09      Hematocrit 37.0 - 48.5 % 44.5   2025-01-09      MCV 82 - 98 fL 96   2025-01-09      MCH 27.0 - 31.0 pg 29.2   2025-01-09      MCHC 32.0 - 36.0 g/dL 30.6 (L)   2025-01-09      RDW 11.5 - 14.5 % 13.7   2025-01-09      Platelet Count 150 - 450 K/uL 220   2025-01-09      MPV 9.2 - 12.9 fL 10.4   2025-01-09      Neuroendocrine/Electrolyte Screening  Name Reference Previous Values   Sodium 136 - 145 mmol/L 139   2025-01-09   138   2025-05-20      Potassium 3.5 - 5.1 mmol/L 4.0   2025-01-09   4.6   2025-05-20      Chloride 95 - 110 mmol/L 108   2025-01-09   105   2025-05-20      CO2 23 - 29 mmol/L 21 (L)   2025-01-09   25   2025-05-20      Creatinine 0.5 - 1.4 mg/dL 0.8   2025-01-09   0.8   2025-05-20      Calcium 8.7 - 10.5 mg/dL 9.7   2025-01-09   9.3   2025-05-20      Anion Gap 8 - 16 mmol/L 10   2025-01-09   8   2025-05-20      TSH 0.400 - 4.000 uIU/mL 2.255   2025-01-09   2.313   2025-07-01      Free T4 0.71 - 1.51 ng/dL 0.83   2025-07-01      Metabolic Screening  Name Reference Previous Values   Glucose 70 - 110 mg/dL 75   2025-01-09   91   2025-05-20      BUN 8 - 23 mg/dL 11   2025-01-09   12   2025-05-20       Albumin 3.5 - 5.2 g/dL 3.9   2025-01-09   4.1   2025-05-20      Cholesterol Total 120 - 199 mg/dL 311 (H)   2025-05-29      Triglycerides 30 - 150 mg/dL 97   2025-05-29      HDL 40 - 75 mg/dL 58   2025-05-29      LDL Cholesterol 63.0 - 159.0 mg/dL 233.6 (H)   2025-05-29      HDL/Cholesterol Ratio 20.0 - 50.0 % 18.6 (L)   2025-05-29      Total Cholesterol/HDL Ratio 2.0 - 5.0 5.4 (H)   2025-05-29      Non-HDL Cholesterol mg/dL 253   2025-05-29      Hemoglobin A1C External 4.0 - 5.6 % 5.5   2025-05-29      Estimated Avg Glucose 68 - 131 mg/dL 111   2025-05-29      Neuro-Nutritional Screening  Name Reference Previous Values   PROTEIN TOTAL 6.0 - 8.4 g/dL 8.0   2025-01-09   7.3   2025-05-20      Folate 4.0 - 24.0 ng/mL 6.1   2025-01-09   8.9   2025-07-01      Thiamine 38 - 122 ug/L 68   2025-01-09      Vitamin B12 180 - 914 ng/L 382   2025-01-09   829   2025-05-20      Methylmalonic Acid 69 - 390 nmol/L 0.28   2025-01-09   0.28   2025-05-20   226   2025-07-01      Liver Function Screening  Name Reference Previous Values   BILIRUBIN TOTAL 0.1 - 1.0 mg/dL 0.3   2025-01-09   0.5   2025-05-20      ALP 55 - 135 U/L 97   2025-01-09   125   2025-05-20      AST 10 - 40 U/L 17   2025-01-09   30   2025-05-20      ALT 10 - 44 U/L 16   2025-01-09   46 (H)   2025-05-20      Neuro-Inflammatory Screening - Serum  Name Reference Previous Values   Methlymalonic Acid <0.40 umol/L 0.62 (H)   2025-01-09      Neuroinfectious Screening  Name Reference Previous Values   RPR Non-reactive    2025-01-09      HIV 1/2 Ag/Ab Non-reactive    2025-01-09      Treponema Pallidum Antibodies (IgG, IgM) Nonreactive    2025-07-01      Neurodegenerative Biomarkers - Serum  Name Reference Previous Values   Phospho-Tau (181P) <0.97 pg/mL 1.71 (H)   2025-01-09   1.53 (H)   2025-07-01      Neurofilament Light Chain, Plasma <=37.9 pg/mL 11.6   2025-01-09      Glial Fibrillary Acid Protein 0.00 - 186.00 pg/mL 73.90    2025-07-01      M Phospho-Tau 217 <=0.185pg/mL 0.76 (H)   2025-07-01                Neurological Relevant Procedures:    No Neurologically Relevant Procedures Available for Review              Neurologically Relevant Imaging:    MRI brain/head without contrast performed on 02/06/2025  Radiologist Interpretation: Negative contrast-enhanced MRI of the brain for age. No acute process.  Provider Interpretation: The radiographic interpretation indicates a moderate degree of regional atrophy in the occipital lobe, as well as minor atrophy in the anterior cingulate, which are often observed in Lewy body disease. Additionally, there is minimal atrophy of the bilateral hippocampi. This finding suggests a high probability of mixed pathology, including Alzheimer's disease-related pathology.  T1-weighted (T1W) Image Summary: The radiographic interpretation indicates a moderate degree of regional sulcal widening in the occipital lobes. There is very minor atrophy observed in the prefrontal cortex and anterior cingulate, while the corpus callosum appears intact. The midbrain and brainstem are also intact, with normal volume and proportions. There is mild-to-moderate bilateral hippocampal volume loss, more pronounced on the right side compared to the left. 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  FLAIR/T2-weighted (T2W) Image Summary: The radiographic interpretation indicates mild gliotic changes in the hippocampi, more pronounced on the left side than the right. There is no evidence of lacunar infarcts or large vessel disease.            Clinical Summary/Interpretation:    Neurobehavioral/Neuropsychiatric Evaluation Interpretation: The review of systems highlights several neurocognitive and neurobehavioral abnormalities correlated with specific brain regions and network pathologies. Memory deficits, such as general memory and  short-term memory issues, suggest dysfunction in the temporal lobes, particularly the hippocampus. Working memory and organizational challenges are linked to prefrontal cortex dysfunction, affecting executive functions. Anomia and auditory comprehension difficulties point to language network disruptions involving the left temporal and frontal regions. Motor symptoms like bradykinesia and tremor may indicate basal ganglia involvement. Mood instability, insomnia, and social withdrawal suggest altered limbic system and frontal-subcortical network activity, contributing to mood and personality changes. Sleep disturbances, including apnea and insomnia, also suggest possible brainstem or hypothalamic involvement, affecting alertness and emotional regulation.  BEHAV5+: Score 1/5 indicates minor behavioral symptoms which align with observed neuropsychiatric findings.  Neurocognitive/Cognition-Focused Evaluation Interpretation: Memory predominant multidomain major cognitive impairment.  Severe Memory Impairment: The patient scored >2 standard deviations below the norm on at least one measure. Impairment appreciated in immediate recall, recall, delayed recall, cued recall, recognition  Severe Visuospatial Impairment: The patient scored >2 standard deviations below the norm on at least one measure. Impairment appreciated in visuospatial apraxia, pareidolia  Severe Executive Impairment: The patient scored >2 standard deviations below the norm on at least one measure. Impairment appreciated in encoding, working memory, lexical fluency, semantic fluency, set-shifting  Mild Language Impairment: The patient scored >2 standard deviations below the norm on at least one measure. Impairment appreciated in naming, semantic  Mild Attention Impairment: The patient scored >2 standard deviations below the norm on at least one measure. Impairment appreciated in attention, orientation, verbal STM  Normal Social Function  Normal Motor/Sensory  Function  Assessment completed in July 2025  MMSE 25/30: Score suggestive of mild cognitive impairment.  MOCA 22/30: Score suggestive of mild cognitive impairment.  Neurological Examination Interpretation: The neurological examination reveals deficits in arousal, grooming, and orientation, suggesting dysfunction in the frontal lobes, which are crucial for executive function and self-awareness. Impairments in attention, serial processing, and fluency indicate possible dysfunction in the prefrontal cortex and its connections, affecting cognitive control and language processing. Bradykinesia, abnormal gait patterns, and motor overflow point to basal ganglia involvement, impacting motor control and coordination. Eyelid apraxia and impaired interhemispheric motor control suggest problems in the motor cortex and corpus callosum, affecting voluntary movement and coordination between the brain's hemispheres. Collectively, these abnormalities indicate potential network pathologies involving frontal-subcortical circuits, disrupting both cognitive and motor functions.  Neurological Imaging Summary:  MRI brain/head without contrast performed on 02/06/2025  Provider Interpretation: The radiographic interpretation indicates a moderate degree of regional atrophy in the occipital lobe, as well as minor atrophy in the anterior cingulate, which are often observed in Lewy body disease. Additionally, there is minimal atrophy of the bilateral hippocampi. This finding suggests a high probability of mixed pathology, including Alzheimer's disease-related pathology.  Radiologist Interpretation: Negative contrast-enhanced MRI of the brain for age. No acute process.    Billing Statement:       I spent a total of 90 minutes (from 11:00 AM to 12:30 PM) on 07/28/2025, engaging in person in a face-to-face consultation with the patient. More than 50% of this time was devoted to counseling on symptoms, treatment plans, risks, therapeutic options,  lifestyle modifications, and safety concerns related to the above diagnoses.     A Review of Systems was completed, encompassing 10 of the 14 systems. All findings were negative, except those noted in the History of Present Illness (HPI) and Review of Systems (ROS) Sections. The systems reviewed were Constitutional (Const), Eyes, Ear/Nose/Throat (ENT), Respiratory (Resp), Cardiovascular (CV), Gastrointestinal (GI), Genitourinary (), Musculoskeletal (MSK), Skin, and Neurological (Neuro).     I spent a total of 5 minutes to reviewing previous laboratory results on the day of the clinical evaluation. This review was an integral part of the face-to-face encounter. The laboratory findings were predominantly negative, with exceptions as noted in the History of Present Illness (HPI) and Assessment.     I spent a total of 5 minutes to reviewing previous diagnostic tests on the day of the clinical evaluation. This activity was directly associated with the face-to-face encounter. The findings from these diagnostic tests were generally within normal limits, with any exceptions as noted in the History of Present Illness (HPI) and Assessment.     I performed a neurobehavioral status examination on the day of clinical evaluation that included a clinical assessment of thinking, reasoning, and judgment to ensure a comprehensive approach in managing the complex and evolving needs of the patient's neurocognitive condition. Please see above HPI and ROS for full details. This exam was performed on the day of clinical evaluation and included 18 minutes spent on direct face-to-face clinical observation and interview with the patient and 15 minutes spent interpreting test results and preparing the report. The total time of 33 minutes spent on the neurobehavioral status examination is not included in the time spent on evaluation and management coding.     Total Billing time spent on encounter/documentation for this patient's evaluation  and management, not including the Neurobehavioral Status Examination: 82 minutes.           The care plan was developed collaboratively with the patient and caregiver, addressing their goals of maintaining functional independence while planning for potential disease progression.     Thank you for entrusting us with the care of your patient. Should you have any questions or concerns, please feel free to contact us at your convenience.     This note was dictated using the M*Modal Fluency Direct voice recognition program. Please be aware that word recognition errors may occasionally occur and might be overlooked during review.            Signing Physician:  Taran Hernandez MD

## 2025-08-08 ENCOUNTER — TELEPHONE (OUTPATIENT)
Facility: CLINIC | Age: 60
End: 2025-08-08
Payer: COMMERCIAL

## 2025-08-08 NOTE — TELEPHONE ENCOUNTER
[facility] to PA received    Patient has an OOP cost of $1850.34 due to not meeting $2550 deductible.    Called and spoke with patient's spouse  Informed that patient is scheduled for a skin biopsy on 9/18 but has an OOP cost of $1850.34.    Spouse will discuss with patient and daughter on how to proceed.    -----------------  Sent reminder message to Radiology to schedule LP

## 2025-08-11 ENCOUNTER — PATIENT MESSAGE (OUTPATIENT)
Facility: CLINIC | Age: 60
End: 2025-08-11
Payer: COMMERCIAL

## 2025-08-14 ENCOUNTER — TELEPHONE (OUTPATIENT)
Facility: CLINIC | Age: 60
End: 2025-08-14
Payer: COMMERCIAL

## 2025-08-21 ENCOUNTER — OFFICE VISIT (OUTPATIENT)
Dept: PSYCHIATRY | Facility: CLINIC | Age: 60
End: 2025-08-21
Payer: COMMERCIAL

## 2025-08-21 VITALS
DIASTOLIC BLOOD PRESSURE: 64 MMHG | SYSTOLIC BLOOD PRESSURE: 99 MMHG | HEART RATE: 73 BPM | BODY MASS INDEX: 34.95 KG/M2 | HEIGHT: 62 IN | WEIGHT: 189.94 LBS

## 2025-08-21 DIAGNOSIS — F41.1 GENERALIZED ANXIETY DISORDER WITH PANIC ATTACKS: ICD-10-CM

## 2025-08-21 DIAGNOSIS — F33.41 RECURRENT MAJOR DEPRESSIVE DISORDER, IN PARTIAL REMISSION: Primary | ICD-10-CM

## 2025-08-21 DIAGNOSIS — R45.89 LONELINESS: ICD-10-CM

## 2025-08-21 DIAGNOSIS — Z79.899 LONG TERM CURRENT USE OF ANTIPSYCHOTIC MEDICATION: ICD-10-CM

## 2025-08-21 DIAGNOSIS — F41.0 GENERALIZED ANXIETY DISORDER WITH PANIC ATTACKS: ICD-10-CM

## 2025-08-21 DIAGNOSIS — G31.84 MCI (MILD COGNITIVE IMPAIRMENT): ICD-10-CM

## 2025-08-21 PROCEDURE — 99999 PR PBB SHADOW E&M-EST. PATIENT-LVL I: CPT | Mod: PBBFAC,,, | Performed by: SOCIAL WORKER

## 2025-08-21 PROCEDURE — 90834 PSYTX W PT 45 MINUTES: CPT | Mod: S$GLB,,, | Performed by: SOCIAL WORKER

## 2025-08-21 PROCEDURE — 99999 PR PBB SHADOW E&M-EST. PATIENT-LVL III: CPT | Mod: PBBFAC,,, | Performed by: STUDENT IN AN ORGANIZED HEALTH CARE EDUCATION/TRAINING PROGRAM

## 2025-08-21 PROCEDURE — 3044F HG A1C LEVEL LT 7.0%: CPT | Mod: CPTII,S$GLB,, | Performed by: SOCIAL WORKER

## 2025-08-21 RX ORDER — CLONAZEPAM 0.25 MG/1
0.25 TABLET, ORALLY DISINTEGRATING ORAL DAILY
Qty: 30 TABLET | Refills: 1 | Status: SHIPPED | OUTPATIENT
Start: 2025-08-21

## 2025-08-25 ENCOUNTER — OFFICE VISIT (OUTPATIENT)
Dept: FAMILY MEDICINE | Facility: CLINIC | Age: 60
End: 2025-08-25
Payer: COMMERCIAL

## 2025-08-25 VITALS
WEIGHT: 192 LBS | OXYGEN SATURATION: 95 % | HEIGHT: 62 IN | DIASTOLIC BLOOD PRESSURE: 67 MMHG | BODY MASS INDEX: 35.33 KG/M2 | SYSTOLIC BLOOD PRESSURE: 94 MMHG | HEART RATE: 84 BPM

## 2025-08-25 DIAGNOSIS — Z12.31 ENCOUNTER FOR SCREENING MAMMOGRAM FOR MALIGNANT NEOPLASM OF BREAST: ICD-10-CM

## 2025-08-25 DIAGNOSIS — E78.49 FAMILIAL HYPERLIPIDEMIA: Primary | ICD-10-CM

## 2025-08-25 DIAGNOSIS — Z23 NEED FOR SHINGLES VACCINE: ICD-10-CM

## 2025-08-25 DIAGNOSIS — Z29.11 NEED FOR PROPHYLACTIC VACCINATION AND INOCULATION AGAINST RESPIRATORY SYNCYTIAL VIRUS (RSV): ICD-10-CM

## 2025-08-25 PROCEDURE — 3044F HG A1C LEVEL LT 7.0%: CPT | Mod: CPTII,S$GLB,, | Performed by: FAMILY MEDICINE

## 2025-08-25 PROCEDURE — 99213 OFFICE O/P EST LOW 20 MIN: CPT | Mod: S$GLB,,, | Performed by: FAMILY MEDICINE

## 2025-08-25 PROCEDURE — 99999 PR PBB SHADOW E&M-EST. PATIENT-LVL IV: CPT | Mod: PBBFAC,,, | Performed by: FAMILY MEDICINE

## 2025-08-25 PROCEDURE — 3074F SYST BP LT 130 MM HG: CPT | Mod: CPTII,S$GLB,, | Performed by: FAMILY MEDICINE

## 2025-08-25 PROCEDURE — 1159F MED LIST DOCD IN RCRD: CPT | Mod: CPTII,S$GLB,, | Performed by: FAMILY MEDICINE

## 2025-08-25 PROCEDURE — 3078F DIAST BP <80 MM HG: CPT | Mod: CPTII,S$GLB,, | Performed by: FAMILY MEDICINE

## 2025-08-25 PROCEDURE — 3008F BODY MASS INDEX DOCD: CPT | Mod: CPTII,S$GLB,, | Performed by: FAMILY MEDICINE

## 2025-08-25 RX ORDER — ROSUVASTATIN CALCIUM 40 MG/1
40 TABLET, COATED ORAL NIGHTLY
Qty: 90 TABLET | Refills: 3 | Status: SHIPPED | OUTPATIENT
Start: 2025-08-25 | End: 2026-08-25

## 2025-08-25 RX ORDER — ZOSTER VACCINE RECOMBINANT, ADJUVANTED 50 MCG/0.5
0.5 KIT INTRAMUSCULAR ONCE
Qty: 1 EACH | Refills: 0 | Status: SHIPPED | OUTPATIENT
Start: 2025-08-25 | End: 2025-08-25

## 2025-08-25 RX ORDER — RESPIRATORY SYNCYTIAL VISUS VACCINE RECOMBINANT, ADJUVANTED 120MCG/0.5
0.5 KIT INTRAMUSCULAR ONCE
Qty: 0.5 ML | Refills: 0 | Status: SHIPPED | OUTPATIENT
Start: 2025-08-25 | End: 2025-08-25

## 2025-08-26 ENCOUNTER — HOSPITAL ENCOUNTER (OUTPATIENT)
Dept: RADIOLOGY | Facility: HOSPITAL | Age: 60
Discharge: HOME | End: 2025-08-26
Attending: PSYCHIATRY & NEUROLOGY
Payer: COMMERCIAL

## 2025-08-26 DIAGNOSIS — F02.80 ALZHEIMER DISEASE: ICD-10-CM

## 2025-08-26 DIAGNOSIS — G31.84 MCI (MILD COGNITIVE IMPAIRMENT): ICD-10-CM

## 2025-08-26 DIAGNOSIS — G21.9 SECONDARY PARKINSONISM, UNSPECIFIED SECONDARY PARKINSONISM TYPE: ICD-10-CM

## 2025-08-26 DIAGNOSIS — G30.9 ALZHEIMER DISEASE: ICD-10-CM

## 2025-08-26 LAB
CLARITY CSF: CLEAR
COLOR CSF: COLORLESS
CSF TUBE NUMBER (OHS): 2
CSF TUBE NUMBER (OHS): 2
GLUCOSE CSF-MCNC: 54 MG/DL (ref 40–70)
LYMPHOCYTES NFR CSF MANUAL: 38 % (ref 40–80)
MONOS+MACROS NFR CSF MANUAL: 63 % (ref 15–45)
PROT CSF-MCNC: 21 MG/DL (ref 15–40)
RBC # CSF: 1 /CU MM
SPECIMEN VOL CSF: 2 ML
WBC # CSF: 1 /CU MM

## 2025-08-26 PROCEDURE — 86592 SYPHILIS TEST NON-TREP QUAL: CPT

## 2025-08-26 PROCEDURE — 86341 ISLET CELL ANTIBODY: CPT | Mod: 59

## 2025-08-26 PROCEDURE — 86255 FLUORESCENT ANTIBODY SCREEN: CPT | Mod: 59

## 2025-08-26 PROCEDURE — 84157 ASSAY OF PROTEIN OTHER: CPT

## 2025-08-26 PROCEDURE — 0358U NEURO ALYS B-AMYL 1-42&1-40: CPT

## 2025-08-26 PROCEDURE — 89051 BODY FLUID CELL COUNT: CPT

## 2025-08-26 PROCEDURE — 82945 GLUCOSE OTHER FLUID: CPT

## 2025-08-26 PROCEDURE — 83520 IMMUNOASSAY QUANT NOS NONAB: CPT

## 2025-08-26 PROCEDURE — 82232 ASSAY OF BETA-2 PROTEIN: CPT

## 2025-08-26 PROCEDURE — 82234 BETA-AMYLOID 1-42 (ABETA 42): CPT

## 2025-08-28 LAB
AL BETA42 CSF-MCNC: 461 PG/ML
IMMUNOLOGIST REVIEW: ABNORMAL
NSE CSF-MCNC: 25 NG/ML
P-TAU181 CSF IA-MCNC: 15.2 PG/ML
TAU PROT CSF-MCNC: 175 PG/ML
TAU PROT/AL BETA42 CSF-RTO: 0.03 RATIO

## 2025-08-29 LAB
B2 MICROGLOB CSF-MCNC: 0.99 MCG/ML (ref 0.7–1.8)
REAGIN AB CSF QL: NON REACTIVE

## 2025-09-02 ENCOUNTER — TELEPHONE (OUTPATIENT)
Dept: PSYCHIATRY | Facility: CLINIC | Age: 60
End: 2025-09-02
Payer: COMMERCIAL

## 2025-09-02 DIAGNOSIS — F41.1 GENERALIZED ANXIETY DISORDER WITH PANIC ATTACKS: Primary | ICD-10-CM

## 2025-09-02 DIAGNOSIS — F41.0 GENERALIZED ANXIETY DISORDER WITH PANIC ATTACKS: Primary | ICD-10-CM

## 2025-09-02 RX ORDER — CLONAZEPAM 0.5 MG/1
0.25 TABLET ORAL DAILY
Qty: 15 TABLET | Refills: 1 | Status: SHIPPED | OUTPATIENT
Start: 2025-09-02